# Patient Record
Sex: FEMALE | Race: WHITE | NOT HISPANIC OR LATINO | Employment: FULL TIME | URBAN - METROPOLITAN AREA
[De-identification: names, ages, dates, MRNs, and addresses within clinical notes are randomized per-mention and may not be internally consistent; named-entity substitution may affect disease eponyms.]

---

## 2017-01-05 ENCOUNTER — TELEPHONE (OUTPATIENT)
Dept: INTERNAL MEDICINE | Facility: CLINIC | Age: 47
End: 2017-01-05

## 2017-01-05 DIAGNOSIS — N30.00 ACUTE CYSTITIS WITHOUT HEMATURIA: Primary | ICD-10-CM

## 2017-01-05 PROBLEM — N39.0 URINARY TRACT INFECTION: Status: ACTIVE | Noted: 2017-01-05

## 2017-01-05 RX ORDER — FLUCONAZOLE 200 MG/1
TABLET ORAL
Qty: 2 TABLET | Refills: 0 | Status: SHIPPED | OUTPATIENT
Start: 2017-01-05 | End: 2017-03-01

## 2017-01-05 RX ORDER — LEVOFLOXACIN 750 MG/1
TABLET ORAL
Qty: 5 TABLET | Refills: 0 | Status: SHIPPED | OUTPATIENT
Start: 2017-01-05 | End: 2017-03-01

## 2017-01-05 NOTE — TELEPHONE ENCOUNTER
Patient needs to come in for a urinalysis and urine culture.  I sent in Levaquin 750 mg daily ×5 days.

## 2017-01-05 NOTE — TELEPHONE ENCOUNTER
Pt has had a UTI for about 1 month and has been getting treatment through her GYN. She has had 3 rounds of antibiotics and she still has symptoms. Should she make appt to see you. Should she do another culture before seeing you. She just had her last culture faxed to us which was about a week ago and it showed that she still has ecoli. Call her at [phone].

## 2017-01-13 LAB
APPEARANCE UR: CLEAR
BACTERIA UR CULT: NORMAL
BACTERIA UR CULT: NORMAL
BILIRUB UR QL STRIP: NORMAL
COLOR UR: YELLOW
GLUCOSE UR QL: NORMAL
HGB UR QL STRIP: NORMAL
KETONES UR QL STRIP: NORMAL
LEUKOCYTE ESTERASE UR QL STRIP: NORMAL
NITRITE UR QL STRIP: NORMAL
PH UR STRIP: 6 [PH] (ref 5–8)
PROT UR QL STRIP: NORMAL
SP GR UR: 1.01 (ref 1–1.03)
UROBILINOGEN UR STRIP-MCNC: NORMAL MG/DL

## 2017-02-15 ENCOUNTER — TELEPHONE (OUTPATIENT)
Dept: INTERNAL MEDICINE | Facility: CLINIC | Age: 47
End: 2017-02-15

## 2017-02-15 NOTE — TELEPHONE ENCOUNTER
Pt said that GYN did thyroid labs on her and her TSH was .04. They suggest that she decrease Levothyroxine to 25mg from 50mg. Last are faxing results to us. Can we send that into pharmacy. Call her at [phone].

## 2017-02-15 NOTE — TELEPHONE ENCOUNTER
I would not make any changes based on the lab from  OB/GYN.  I do not know which lab they use and when I checked her TSH back in November or December it was perfect.  Even if the TSH is a little low, it would not cause any problems until I reevaluate her.  I would stay on the same dose and keep previously scheduled follow-up appointment.

## 2017-03-01 ENCOUNTER — OFFICE VISIT (OUTPATIENT)
Dept: INTERNAL MEDICINE | Facility: CLINIC | Age: 47
End: 2017-03-01

## 2017-03-01 VITALS
HEIGHT: 64 IN | WEIGHT: 168 LBS | BODY MASS INDEX: 28.68 KG/M2 | DIASTOLIC BLOOD PRESSURE: 70 MMHG | HEART RATE: 82 BPM | OXYGEN SATURATION: 95 % | SYSTOLIC BLOOD PRESSURE: 122 MMHG

## 2017-03-01 DIAGNOSIS — Z23 NEED FOR TDAP VACCINATION: ICD-10-CM

## 2017-03-01 DIAGNOSIS — Z87.42 HISTORY OF MENORRHAGIA: ICD-10-CM

## 2017-03-01 DIAGNOSIS — Z92.29 HISTORY OF TETANUS, DIPHTHERIA, AND ACELLULAR PERTUSSIS BOOSTER VACCINATION (TDAP): ICD-10-CM

## 2017-03-01 DIAGNOSIS — H61.21 HEARING LOSS OF RIGHT EAR DUE TO CERUMEN IMPACTION: Primary | ICD-10-CM

## 2017-03-01 PROBLEM — Z87.440 HISTORY OF URINARY TRACT INFECTION: Status: ACTIVE | Noted: 2017-01-05

## 2017-03-01 PROBLEM — Z87.440 HISTORY OF URINARY TRACT INFECTION: Status: RESOLVED | Noted: 2017-01-05 | Resolved: 2017-03-01

## 2017-03-01 PROCEDURE — 99212 OFFICE O/P EST SF 10 MIN: CPT | Performed by: INTERNAL MEDICINE

## 2017-03-01 PROCEDURE — 69210 REMOVE IMPACTED EAR WAX UNI: CPT | Performed by: INTERNAL MEDICINE

## 2017-03-01 PROCEDURE — 90471 IMMUNIZATION ADMIN: CPT | Performed by: INTERNAL MEDICINE

## 2017-03-01 PROCEDURE — 90715 TDAP VACCINE 7 YRS/> IM: CPT | Performed by: INTERNAL MEDICINE

## 2017-03-01 RX ORDER — LEVOTHYROXINE SODIUM 0.05 MG/1
TABLET ORAL
Qty: 90 TABLET | Refills: 3
Start: 2017-03-01 | End: 2017-11-06 | Stop reason: SDUPTHER

## 2017-03-01 NOTE — PROGRESS NOTES
03/01/2017    Patient Information  Louann Roberts                                                                                          [address] [place] [address]      1970  [phone]      Chief Complaint:     Decreased hearing right ear.    History of Present Illness:    Patient with history of allergic rhinitis/environmental allergy, hyperlipidemia, hypothyroidism, chronic insomnia.  She presents today with complaints of decreased hearing in her right ear.  She recently went to the [facility] clinic and was diagnosed with possible flu as well as a left ear infection.  Her symptoms have improved but she is now complaining of decreased hearing in the right ear without fever, chills, or other systemic signs or symptoms.  Her past medical history extensively reviewed and updated where necessary.  This reveals she needs a TDaP vaccination.    Review of Systems   Constitution: Negative.   HENT: Positive for hearing loss.    Eyes: Negative.    Cardiovascular: Negative.    Respiratory: Negative.    Endocrine: Negative.    Hematologic/Lymphatic: Negative.    Skin: Negative.    Musculoskeletal: Negative.    Gastrointestinal: Negative.    Genitourinary: Negative.    Neurological: Negative.    Psychiatric/Behavioral: Negative.    Allergic/Immunologic: Negative.        Active Problems:    Patient Active Problem List   Diagnosis   • Allergic rhinitis   • Generalized anxiety disorder   • Hyperlipidemia   • Hypothyroidism   • Multiple environmental allergies   • Chronic insomnia   • Vitamin D deficiency   • Therapeutic drug monitoring   • Routine physical examination   • Generalized osteoarthritis of multiple sites   • History of menorrhagia   • Hearing loss of right ear due to cerumen impaction         Past Medical History   Diagnosis Date   • History of Candida vaginitis 11/03/2014 11/03/2014--patient called into the office with complaints of a yeast infection. No other symptoms of concern.  Diflucan 200 mg by mouth 1 now and repeat in one week.   • History of chest x-ray 12/9/2009 12/09/2009--chest x-ray no active disease.   • History of Dysplastic nevus of skin 3/24/2015     03/24/2014--5 mm punch biopsy performed for suspicious pigmented lesion mid, left back. Pathology returned compound melanocytic nevus with mild architectural disorder and mild cytologic atypia of melanocytes. (Dysplastic nevus, mild). Negative margins.   • History of migraine 3/18/2014     03/18/2014--patient reports that her migraine headaches have essentially resolved. She has not had one for several years.   • History of routine gynecologic exam yearly     Patient sees a gynecologist.   • History of Sprain of left knee 03/06/2014 03/18/2014--patient reports that her knee symptoms have totally resolved.  03/06/2014--patient presented with pain and swelling in her left knee that began after she was climbing over a gate and twisted her knee. Examination reveals some soft tissue swelling and perhaps a mild effusion particularly in the suprapatellar region. Her medial and lateral collateral ligaments seem to be somewhat lax. Th   • History of tetanus, diphtheria, and acellular pertussis booster vaccination (Tdap) 3/1/2017     03/01/2017--TDaP given.   • History of urinary tract infection 1/5/2017 01/05/2017--patient reports she has a recurrent or persistent urinary tract infection that has been treated by the gynecologist unsuccessfully.  We had them fax over her most recent urine culture which revealed Escherichia coli that was resistant to Bactrim but was sensitive to Macrobid and Cipro.  Patient needs to come in for urinalysis and urine culture and then I will start her empirically on Levaquin 750 mg daily ×5 days.         Past Surgical History   Procedure Laterality Date   • Breast augmentation  25 years     25 years of age--silicone   • Skin biopsy  03/24/2014 03/24/2014--5 mm punch biopsy performed for  suspicious pigmented lesion mid, left back. Pathology returned compound melanocytic nevus with mild architectural disorder and mild cytologic atypia of melanocytes. (Dysplastic nevus, mild). Negative margins.   • D&c hysteroscopy endometrial ablation N/A 12/12/2016 12/12/2016--Procedure: DILATATION AND CURETTAGE HYSTEROSCOPY NOVASURE ;  Surgeon: Imelda Brito MD;  Location: [facility];  Service:          No Known Allergies        Current Outpatient Prescriptions:   •  acetaminophen (TYLENOL) 500 MG tablet, Take 500 mg by mouth Every 6 (Six) Hours As Needed for mild pain (1-3)., Disp: , Rfl:   •  aspirin 81 MG tablet, Take 162 mg by mouth As Needed., Disp: , Rfl:   •  cetirizine (ZYRTEC ALLERGY) 10 MG tablet, Take 10 mg by mouth Daily., Disp: , Rfl:   •  levothyroxine (SYNTHROID) 50 MCG tablet, One by mouth daily as directed for thyroid., Disp: 90 tablet, Rfl: 3  •  Misc Natural Products (COSAMIN ASU ADVANCED FORMULA) capsule, 3 by mouth daily as directed, Disp: 90 capsule, Rfl: 0  •  Probiotic Product (PROBIOTIC DAILY PO), Take 1 tablet by mouth Daily., Disp: , Rfl:   •  zolpidem (AMBIEN) 10 MG tablet, Take 1 tablet by mouth at night as needed for sleep., Disp: 90 tablet, Rfl: 1      Family History   Problem Relation Age of Onset   • Hyperlipidemia Mother    • Hyperlipidemia Father    • Diabetes Paternal Uncle      Type 2   • Diabetes Paternal Grandmother      Type 2         Social History     Social History   • Marital status:      Spouse name: N/A   • Number of children: N/A   • Years of education: N/A     Occupational History   • [employer]      Social History Main Topics   • Smoking status: Former Smoker     Packs/day: 1.00     Years: 12.00     Types: Cigarettes, Electronic Cigarette     Quit date: 07/2013   • Smokeless tobacco: Never Used      Comment: used vapor prior to quitting    • Alcohol use Yes      Comment: Occasionally   • Drug use: No   • Sexual activity: Yes     Partners: Male     Other  "Topics Concern   • Not on file     Social History Narrative         Vitals:    03/01/17 1256   BP: 122/70   Pulse: 82   SpO2: 95%   Weight: 168 lb (76.2 kg)   Height: 64\" (162.6 cm)          Physical Exam:    General: Alert and oriented x 3.  No acute distress.  Normal affect.  HEENT: Pupils equal, round, reactive to light; extraocular movements intact; sclerae nonicteric; pharynx, ear canals and TMs normal after removal of cerumen from the right ear canal using irrigation and curettage.  Neck: Without JVD, thyromegaly, bruit, or adenopathy.  Lungs: Clear to auscultation in all fields.  Heart: Regular rate and rhythm without murmur, rub, gallop, or click.  Abdomen: Soft, nontender, without hepatosplenomegaly or hernia.  Bowel sounds normal.  : Deferred.  Rectal: Deferred.  Extremities: Without clubbing, cyanosis, edema, or pulse deficit.  Neurologic: Intact without focal deficit.  Normal station and gait observed during ingress and egress from the examination room.  Skin: Without significant lesion.  Musculoskeletal: Unremarkable.      Lab/other results:      Assessment/Plan:     Diagnosis Plan   1. Hearing loss of right ear due to cerumen impaction     2. Need for Tdap vaccination     3. History of tetanus, diphtheria, and acellular pertussis booster vaccination (Tdap)         Patient presents with complaints of hearing loss in the right ear and evaluation reveals this is due to impacted cerumen.  See procedure note.  Patient needs her TDaP vaccination.  She also has a history of menorrhagia and I reviewed fairly recent surgical procedure for this.      Procedures    Verbal informed consent given.  Risks, benefits, side effects discussed.  Cerumen was removed from the right ear canal using dilatation and curettage.  Patient tolerated procedure well without apparent complication.  Her ear canal and TMs little erythematous from the procedure but not significant.    "

## 2017-03-02 RX ORDER — ZOLPIDEM TARTRATE 10 MG/1
TABLET ORAL
Qty: 90 TABLET | Refills: 1 | OUTPATIENT
Start: 2017-03-02 | End: 2017-11-08 | Stop reason: SDUPTHER

## 2017-05-08 ENCOUNTER — APPOINTMENT (OUTPATIENT)
Dept: WOMENS IMAGING | Facility: HOSPITAL | Age: 47
End: 2017-05-08

## 2017-05-08 PROCEDURE — 77065 DX MAMMO INCL CAD UNI: CPT | Performed by: RADIOLOGY

## 2017-11-05 DIAGNOSIS — E03.9 HYPOTHYROIDISM: ICD-10-CM

## 2017-11-06 ENCOUNTER — OFFICE VISIT (OUTPATIENT)
Dept: INTERNAL MEDICINE | Facility: CLINIC | Age: 47
End: 2017-11-06

## 2017-11-06 VITALS
WEIGHT: 166.8 LBS | SYSTOLIC BLOOD PRESSURE: 120 MMHG | HEART RATE: 73 BPM | DIASTOLIC BLOOD PRESSURE: 78 MMHG | HEIGHT: 64 IN | BODY MASS INDEX: 28.48 KG/M2 | OXYGEN SATURATION: 99 %

## 2017-11-06 DIAGNOSIS — G93.32 CHRONIC FATIGUE DISORDER: ICD-10-CM

## 2017-11-06 DIAGNOSIS — Z00.00 ROUTINE PHYSICAL EXAMINATION: ICD-10-CM

## 2017-11-06 DIAGNOSIS — Z51.81 THERAPEUTIC DRUG MONITORING: ICD-10-CM

## 2017-11-06 DIAGNOSIS — R73.01 IMPAIRED FASTING GLUCOSE: ICD-10-CM

## 2017-11-06 DIAGNOSIS — E78.5 HYPERLIPIDEMIA, UNSPECIFIED HYPERLIPIDEMIA TYPE: Primary | Chronic | ICD-10-CM

## 2017-11-06 DIAGNOSIS — E03.9 HYPOTHYROIDISM, UNSPECIFIED TYPE: Chronic | ICD-10-CM

## 2017-11-06 DIAGNOSIS — E55.9 VITAMIN D DEFICIENCY: Chronic | ICD-10-CM

## 2017-11-06 PROBLEM — H61.21 HEARING LOSS OF RIGHT EAR DUE TO CERUMEN IMPACTION: Status: RESOLVED | Noted: 2017-03-01 | Resolved: 2017-11-06

## 2017-11-06 LAB
CRP SERPL-MCNC: 0.36 MG/DL (ref 0–0.5)
ERYTHROCYTE [DISTWIDTH] IN BLOOD BY AUTOMATED COUNT: 13.4 % (ref 11.7–13)
HBA1C MFR BLD: 5.2 % (ref 4.8–5.6)
HCT VFR BLD AUTO: 37.9 % (ref 35.6–45.5)
HGB BLD-MCNC: 12.5 G/DL (ref 11.9–15.5)
MCH RBC QN AUTO: 29.6 PG (ref 26.9–32)
MCHC RBC AUTO-ENTMCNC: 33 G/DL (ref 32.4–36.3)
MCV RBC AUTO: 89.6 FL (ref 80.5–98.2)
PLATELET # BLD AUTO: 263 10*3/MM3 (ref 140–500)
RBC # BLD AUTO: 4.23 10*6/MM3 (ref 3.9–5.2)
WBC # BLD AUTO: 7.53 10*3/MM3 (ref 4.5–10.7)

## 2017-11-06 PROCEDURE — 99214 OFFICE O/P EST MOD 30 MIN: CPT | Performed by: INTERNAL MEDICINE

## 2017-11-06 RX ORDER — LEVOTHYROXINE SODIUM 0.05 MG/1
TABLET ORAL
Qty: 90 TABLET | Refills: 3 | Status: SHIPPED | OUTPATIENT
Start: 2017-11-06 | End: 2018-10-16 | Stop reason: SDUPTHER

## 2017-11-06 NOTE — PROGRESS NOTES
11/06/2017    Patient Information  Louann Roberts                                                                                          [address] [address]      1970  [phone]      Chief Complaint:     Follow-up hyperlipidemia, hypothyroidism, vitamin D deficiency and environmental allergies.  Patient complaining of fatigue as described below.    History of Present Illness:    Patient with a history of medical problems as outlined in the chief complaint that have been fairly stable for about the past year.  She presents today for a routine follow-up with lab prior in order to monitor her chronic medical issues.  She has very mild hyperlipidemia which has not required medication as of yet.  She also has hypothyroidism which we will assess.    The history regarding chronic fatigue and hair loss:    11/06/2017--patient presents with a several month history of generalized fatigue.  She is also complaining of hair loss and was concerned for thyroid was not in therapeutic range.  Her thyroid functions are entirely normal today.  She does have a mildly elevated blood sugar of 105.  We will check hemoglobin A1c, CBC, and CRP today.    Review of Systems   Constitution: Positive for malaise/fatigue.   HENT: Negative.    Eyes: Negative.    Cardiovascular: Negative.    Respiratory: Negative.    Endocrine: Negative.    Hematologic/Lymphatic: Negative.    Skin: Negative.    Musculoskeletal: Negative.    Gastrointestinal: Negative.    Genitourinary: Negative.    Neurological: Negative.    Psychiatric/Behavioral: Negative.    Allergic/Immunologic: Negative.        Active Problems:    Patient Active Problem List   Diagnosis   • Allergic rhinitis   • Generalized anxiety disorder   • Hyperlipidemia   • Hypothyroidism   • Multiple environmental allergies   • Chronic insomnia   • Vitamin D deficiency   • Therapeutic drug monitoring   • Routine physical examination   • Generalized osteoarthritis  of multiple sites   • Impaired fasting glucose   • Chronic fatigue disorder         Past Medical History:   Diagnosis Date   • Allergic rhinitis 6/28/2016    Patient has never had allergy testing. Primarily has seasonal allergies.   • Chronic insomnia 6/28/2016   • Generalized anxiety disorder 6/28/2016   • Generalized osteoarthritis of multiple sites 11/10/2016    11/10/2016--patient reports she's having some problems with joint pain and stiffness particularly in the morning.  This particularly involves her hands and fingers and does seem to get better through the day.  She also has similar symptoms of her knees.  I have recommended Cosamin ASU.   • History of Candida vaginitis 11/03/2014 11/03/2014--patient called into the office with complaints of a yeast infection. No other symptoms of concern. Diflucan 200 mg by mouth 1 now and repeat in one week.   • History of chest x-ray 12/9/2009 12/09/2009--chest x-ray no active disease.   • History of Dysplastic nevus of skin 3/24/2015    03/24/2014--5 mm punch biopsy performed for suspicious pigmented lesion mid, left back. Pathology returned compound melanocytic nevus with mild architectural disorder and mild cytologic atypia of melanocytes. (Dysplastic nevus, mild). Negative margins.   • History of menorrhagia 12/12/2016 12/12/2016--Procedure: DILATATION AND CURETTAGE HYSTEROSCOPY JOSE MARTIN ;  Surgeon: Imelda Brito MD;  Location: [facility];  Service:    • History of migraine 3/18/2014    03/18/2014--patient reports that her migraine headaches have essentially resolved. She has not had one for several years.   • History of routine gynecologic exam yearly    Patient sees a gynecologist.   • History of Sprain of left knee 03/06/2014 03/18/2014--patient reports that her knee symptoms have totally resolved.  03/06/2014--patient presented with pain and swelling in her left knee that began after she was climbing over a gate and twisted her knee. Examination  reveals some soft tissue swelling and perhaps a mild effusion particularly in the suprapatellar region. Her medial and lateral collateral ligaments seem to be somewhat lax. Th   • History of tetanus, diphtheria, and acellular pertussis booster vaccination (Tdap) 3/1/2017    03/01/2017--TDaP given.   • History of urinary tract infection 1/5/2017 01/05/2017--patient reports she has a recurrent or persistent urinary tract infection that has been treated by the gynecologist unsuccessfully.  We had them fax over her most recent urine culture which revealed Escherichia coli that was resistant to Bactrim but was sensitive to Macrobid and Cipro.  Patient needs to come in for urinalysis and urine culture and then I will start her empirically on Levaquin 750 mg daily ×5 days.   • Hyperlipidemia 2/26/2014 02/26/2014--total cholesterol 206, triglycerides normal at 86, LDL cholesterol 135, HDL cholesterol 54. Recommend diet and exercise. Reevaluated in a few months with an NMR.   • Hypothyroidism 6/15/2015    09/13/2016--patient seen in follow-up and the results of the thyroid ultrasound discussed.  She has had an elevated TSH on several occasions over the past year or more.  She does have some symptoms consisting of fatigue, depression, weight gain, hair loss.  Levothyroxine 50 µg per day initiated.  Patient will follow-up in about 6 weeks with nonfasting lab work to reassess.  08/29/2016--normal thyroid ultrasound.  08/19/2016--routine annual exam.  TSH lightly elevated at 4.26.  Upper limit of normal at 4.20.  Ultrasound of thyroid ordered.  06/15/2015--TSH elevated at 4.51.  Thyroid antibodies were negative.  06/15/2015--patient seen for a routine physical examination and noted to have a mildly elevated TSH of 5.05. Repeat thyroid function tests ordered as well as thyroid antibodies. Patient will follow up on the phone.   • Multiple environmental allergies 6/28/2016    Patient has never had allergy testing. Primarily  has seasonal allergies.   • Vitamin D deficiency 8/16/2016         Past Surgical History:   Procedure Laterality Date   • BREAST AUGMENTATION  25 years    25 years of age--silicone   • D&C HYSTEROSCOPY ENDOMETRIAL ABLATION N/A 12/12/2016 12/12/2016--Procedure: DILATATION AND CURETTAGE HYSTEROSCOPY NOVASURE ;  Surgeon: Imelda Brito MD;  Location: [facility]  Service:    • SKIN BIOPSY  03/24/2014 03/24/2014--5 mm punch biopsy performed for suspicious pigmented lesion mid, left back. Pathology returned compound melanocytic nevus with mild architectural disorder and mild cytologic atypia of melanocytes. (Dysplastic nevus, mild). Negative margins.         No Known Allergies        Current Outpatient Prescriptions:   •  acetaminophen (TYLENOL) 500 MG tablet, Take 500 mg by mouth Every 6 (Six) Hours As Needed for mild pain (1-3)., Disp: , Rfl:   •  aspirin 81 MG tablet, Take 162 mg by mouth As Needed., Disp: , Rfl:   •  cetirizine (ZYRTEC ALLERGY) 10 MG tablet, Take 10 mg by mouth Daily., Disp: , Rfl:   •  levothyroxine (SYNTHROID, LEVOTHROID) 50 MCG tablet, ONE BY MOUTH DAILY AS DIRECTED FOR THYROID., Disp: 90 tablet, Rfl: 3  •  Misc Natural Products (COSAMIN ASU ADVANCED FORMULA) capsule, 3 by mouth daily as directed, Disp: 90 capsule, Rfl: 0  •  Probiotic Product (PROBIOTIC DAILY PO), Take 1 tablet by mouth Daily., Disp: , Rfl:   •  zolpidem (AMBIEN) 10 MG tablet, TAKE 1 TABLET BY MOUTH AT BEDTIME, Disp: 90 tablet, Rfl: 1      Family History   Problem Relation Age of Onset   • Hyperlipidemia Mother    • Hyperlipidemia Father    • Diabetes Paternal Uncle      Type 2   • Diabetes Paternal Grandmother      Type 2         Social History     Social History   • Marital status:      Spouse name: N/A   • Number of children: N/A   • Years of education: N/A     Occupational History   • [employer]      Social History Main Topics   • Smoking status: Former Smoker     Packs/day: 1.00     Years: 12.00     Types:  "Cigarettes, Electronic Cigarette     Quit date: 07/2013   • Smokeless tobacco: Never Used      Comment: used vapor prior to quitting    • Alcohol use Yes      Comment: Occasionally   • Drug use: No   • Sexual activity: Yes     Partners: Male     Other Topics Concern   • Not on file     Social History Narrative         Vitals:    11/06/17 0718   BP: 120/78   Pulse: 73   SpO2: 99%   Weight: 166 lb 12.8 oz (75.7 kg)   Height: 64\" (162.6 cm)          Physical Exam:    General: Alert and oriented x 3.  No acute distress.  Normal affect.  HEENT: Pupils equal, round, reactive to light; extraocular movements intact; sclerae nonicteric; pharynx, ear canals and TMs normal.  Neck: Without JVD, thyromegaly, bruit, or adenopathy.  Lungs: Clear to auscultation in all fields.  Heart: Regular rate and rhythm without murmur, rub, gallop, or click.  Abdomen: Soft, nontender, without hepatosplenomegaly or hernia.  Bowel sounds normal.  : Deferred.  Rectal: Deferred.  Extremities: Without clubbing, cyanosis, edema, or pulse deficit.  Neurologic: Intact without focal deficit.  Normal station and gait observed during ingress and egress from the examination room.  Skin: Without significant lesion.  Musculoskeletal: Unremarkable.      Lab/other results:    NMR reveals a total cholesterol 193.  Triglycerides normal at 126.  LDL particle number borderline elevated at 1426.  Small LDL particle number elevated at 755.  HDL particle number is normal at 31.8.  CMP normal except blood sugar elevated at 105.  Thyroid function tests are entirely normal.  Vitamin D normal.    Assessment/Plan:     Diagnosis Plan   1. Hyperlipidemia, unspecified hyperlipidemia type     2. Hypothyroidism, unspecified type     3. Vitamin D deficiency     4. Impaired fasting glucose     5. Chronic fatigue disorder       Patient has mild hyperlipidemia/dyslipidemia that is not bad enough at the present time to warrant medication.  Her thyroid is entirely therapeutic so " that is not the etiology of her fatigue.  The fatigue doesn't need further evaluation and so does apparent new diagnosis of impaired fasting glucose.    Plan is as follows: Check CBC, CRP, hemoglobin A1c.  Patient will follow-up on the phone for the results.  I have recommended exercise and weight loss.  I will have her follow-up in 6 months for annual.      Procedures

## 2017-11-08 RX ORDER — ZOLPIDEM TARTRATE 10 MG/1
TABLET ORAL
Qty: 90 TABLET | Refills: 1 | OUTPATIENT
Start: 2017-11-08 | End: 2017-11-28 | Stop reason: SDUPTHER

## 2017-11-28 RX ORDER — ZOLPIDEM TARTRATE 10 MG/1
10 TABLET ORAL NIGHTLY PRN
Qty: 30 TABLET | Refills: 2 | OUTPATIENT
Start: 2017-11-28 | End: 2018-07-03 | Stop reason: SDUPTHER

## 2017-11-28 RX ORDER — ZOLPIDEM TARTRATE 10 MG/1
10 TABLET ORAL NIGHTLY PRN
Qty: 30 TABLET | Refills: 5 | OUTPATIENT
Start: 2017-11-28 | End: 2017-11-28 | Stop reason: SDUPTHER

## 2017-12-08 ENCOUNTER — TELEPHONE (OUTPATIENT)
Dept: INTERNAL MEDICINE | Facility: CLINIC | Age: 47
End: 2017-12-08

## 2017-12-08 RX ORDER — FLUCONAZOLE 200 MG/1
200 TABLET ORAL DAILY
Qty: 2 TABLET | Refills: 0 | Status: SHIPPED | OUTPATIENT
Start: 2017-12-08 | End: 2018-07-20 | Stop reason: SDUPTHER

## 2017-12-08 NOTE — TELEPHONE ENCOUNTER
Pt called in and said that she went to [facility] with sinus infection and they put her on antibiotic. She said she always gets a UTI from them.  She ask if we could send in Difflucan to [facility] for her.

## 2017-12-08 NOTE — TELEPHONE ENCOUNTER
That would be a yeast infection not a UTI.  Diflucan 200 mg by mouth now ×1 and repeat in one week.

## 2018-02-16 ENCOUNTER — APPOINTMENT (OUTPATIENT)
Dept: WOMENS IMAGING | Facility: HOSPITAL | Age: 48
End: 2018-02-16

## 2018-02-16 PROCEDURE — 77067 SCR MAMMO BI INCL CAD: CPT | Performed by: RADIOLOGY

## 2018-02-16 PROCEDURE — 77063 BREAST TOMOSYNTHESIS BI: CPT | Performed by: RADIOLOGY

## 2018-07-03 RX ORDER — ZOLPIDEM TARTRATE 10 MG/1
10 TABLET ORAL
Qty: 30 TABLET | Refills: 0 | OUTPATIENT
Start: 2018-07-03 | End: 2018-08-01

## 2018-07-20 ENCOUNTER — TELEPHONE (OUTPATIENT)
Dept: INTERNAL MEDICINE | Facility: CLINIC | Age: 48
End: 2018-07-20

## 2018-07-20 RX ORDER — FLUCONAZOLE 200 MG/1
TABLET ORAL
Qty: 5 TABLET | Refills: 0 | Status: SHIPPED | OUTPATIENT
Start: 2018-07-20 | End: 2018-08-01

## 2018-07-24 ENCOUNTER — TELEPHONE (OUTPATIENT)
Dept: INTERNAL MEDICINE | Facility: CLINIC | Age: 48
End: 2018-07-24

## 2018-07-24 DIAGNOSIS — K64.9 ACUTE HEMORRHOID: Primary | ICD-10-CM

## 2018-08-01 ENCOUNTER — OFFICE VISIT (OUTPATIENT)
Dept: SURGERY | Facility: CLINIC | Age: 48
End: 2018-08-01

## 2018-08-01 VITALS
OXYGEN SATURATION: 98 % | DIASTOLIC BLOOD PRESSURE: 80 MMHG | BODY MASS INDEX: 25.27 KG/M2 | WEIGHT: 148 LBS | HEART RATE: 82 BPM | SYSTOLIC BLOOD PRESSURE: 110 MMHG | HEIGHT: 64 IN

## 2018-08-01 DIAGNOSIS — K64.2 GRADE III HEMORRHOIDS: Primary | ICD-10-CM

## 2018-08-01 PROCEDURE — 99203 OFFICE O/P NEW LOW 30 MIN: CPT | Performed by: SURGERY

## 2018-08-02 RX ORDER — ZOLPIDEM TARTRATE 10 MG/1
TABLET ORAL
Qty: 30 TABLET | Refills: 0 | OUTPATIENT
Start: 2018-08-02

## 2018-08-02 RX ORDER — ZOLPIDEM TARTRATE 10 MG/1
10 TABLET ORAL NIGHTLY PRN
Qty: 30 TABLET | Refills: 3 | OUTPATIENT
Start: 2018-08-02 | End: 2018-12-19 | Stop reason: SDUPTHER

## 2018-08-02 NOTE — PROGRESS NOTES
Chief Complaint   Patient presents with   • Hemorrhoids        Patient is a 48 y.o. female referred by Eulogio Steven MD for Evaluation of hemorrhoids.  Patient reports several months ago she had a bout of constipation with a hemorrhoid flaring up.  Patient is reporting it no longer is painful or bleeding.  However, patient is unable to keep it reduced and having difficulties with hygiene.  Patient reports she has never had this in the past.  Patient denies ever having a colonoscopy.  Patient has tried over-the-counter preparations which does help but does not make it go away.     Past Medical History:   Diagnosis Date   • Allergic rhinitis 6/28/2016    Patient has never had allergy testing. Primarily has seasonal allergies.   • Chronic insomnia 6/28/2016   • Generalized anxiety disorder 6/28/2016   • Generalized osteoarthritis of multiple sites 11/10/2016    11/10/2016--patient reports she's having some problems with joint pain and stiffness particularly in the morning.  This particularly involves her hands and fingers and does seem to get better through the day.  She also has similar symptoms of her knees.  I have recommended Cosamin ASU.   • History of Candida vaginitis 11/03/2014 11/03/2014--patient called into the office with complaints of a yeast infection. No other symptoms of concern. Diflucan 200 mg by mouth 1 now and repeat in one week.   • History of chest x-ray 12/9/2009 12/09/2009--chest x-ray no active disease.   • History of Dysplastic nevus of skin 3/24/2015    03/24/2014--5 mm punch biopsy performed for suspicious pigmented lesion mid, left back. Pathology returned compound melanocytic nevus with mild architectural disorder and mild cytologic atypia of melanocytes. (Dysplastic nevus, mild). Negative margins.   • History of menorrhagia 12/12/2016 12/12/2016--Procedure: DILATATION AND CURETTAGE HYSTEROSCOPY NOVASURE ;  Surgeon: Imelda Brito MD;  Location: [facility];  Service:    •  History of migraine 3/18/2014    03/18/2014--patient reports that her migraine headaches have essentially resolved. She has not had one for several years.   • History of routine gynecologic exam yearly    Patient sees a gynecologist.   • History of Sprain of left knee 03/06/2014 03/18/2014--patient reports that her knee symptoms have totally resolved.  03/06/2014--patient presented with pain and swelling in her left knee that began after she was climbing over a gate and twisted her knee. Examination reveals some soft tissue swelling and perhaps a mild effusion particularly in the suprapatellar region. Her medial and lateral collateral ligaments seem to be somewhat lax. Th   • History of tetanus, diphtheria, and acellular pertussis booster vaccination (Tdap) 3/1/2017    03/01/2017--TDaP given.   • History of urinary tract infection 1/5/2017 01/05/2017--patient reports she has a recurrent or persistent urinary tract infection that has been treated by the gynecologist unsuccessfully.  We had them fax over her most recent urine culture which revealed Escherichia coli that was resistant to Bactrim but was sensitive to Macrobid and Cipro.  Patient needs to come in for urinalysis and urine culture and then I will start her empirically on Levaquin 750 mg daily ×5 days.   • Hyperlipidemia 2/26/2014 02/26/2014--total cholesterol 206, triglycerides normal at 86, LDL cholesterol 135, HDL cholesterol 54. Recommend diet and exercise. Reevaluated in a few months with an NMR.   • Hypothyroidism 6/15/2015    09/13/2016--patient seen in follow-up and the results of the thyroid ultrasound discussed.  She has had an elevated TSH on several occasions over the past year or more.  She does have some symptoms consisting of fatigue, depression, weight gain, hair loss.  Levothyroxine 50 µg per day initiated.  Patient will follow-up in about 6 weeks with nonfasting lab work to reassess.  08/29/2016--normal thyroid ultrasound.   08/19/2016--routine annual exam.  TSH lightly elevated at 4.26.  Upper limit of normal at 4.20.  Ultrasound of thyroid ordered.  06/15/2015--TSH elevated at 4.51.  Thyroid antibodies were negative.  06/15/2015--patient seen for a routine physical examination and noted to have a mildly elevated TSH of 5.05. Repeat thyroid function tests ordered as well as thyroid antibodies. Patient will follow up on the phone.   • Multiple environmental allergies 6/28/2016    Patient has never had allergy testing. Primarily has seasonal allergies.   • Vitamin D deficiency 8/16/2016     Past Surgical History:   Procedure Laterality Date   • BREAST AUGMENTATION Bilateral 1992   • D&C HYSTEROSCOPY ENDOMETRIAL ABLATION N/A 12/12/2016 12/12/2016--Procedure: DILATATION AND CURETTAGE HYSTEROSCOPY NOVASURE ;  Surgeon: Imelda Brito MD;  Location: [facility];  Service:    • SKIN BIOPSY  03/24/2014 03/24/2014--5 mm punch biopsy performed for suspicious pigmented lesion mid, left back. Pathology returned compound melanocytic nevus with mild architectural disorder and mild cytologic atypia of melanocytes. (Dysplastic nevus, mild). Negative margins.     Family History   Problem Relation Age of Onset   • Hyperlipidemia Mother    • Hyperlipidemia Father    • Diabetes Paternal Uncle         Type 2   • Diabetes Paternal Grandmother         Type 2     Social History   Substance Use Topics   • Smoking status: Former Smoker     Packs/day: 1.00     Years: 12.00     Types: Cigarettes, Electronic Cigarette     Quit date: 07/2013   • Smokeless tobacco: Never Used      Comment: used vapor prior to quitting    • Alcohol use Yes      Comment: Occasionally         Current Outpatient Prescriptions:   •  acetaminophen (TYLENOL) 500 MG tablet, Take 500 mg by mouth Every 6 (Six) Hours As Needed for mild pain (1-3)., Disp: , Rfl:   •  cetirizine (ZYRTEC ALLERGY) 10 MG tablet, Take 10 mg by mouth As Needed., Disp: , Rfl:   •  levothyroxine (SYNTHROID,  "LEVOTHROID) 50 MCG tablet, ONE BY MOUTH DAILY AS DIRECTED FOR THYROID., Disp: 90 tablet, Rfl: 3  •  zolpidem (AMBIEN) 10 MG tablet, Take 1 tablet by mouth At Night As Needed for Sleep., Disp: 30 tablet, Rfl: 3    Review of Systems  All other systems have been reviewed and are negative.  Vitals:    08/01/18 1306   BP: 110/80   Pulse: 82   SpO2: 98%   Weight: 67.1 kg (148 lb)   Height: 162.6 cm (64\")       Physical Exam  General/physcological:   Alert and oriented x3 in no acute distress  HEENT: Normal cephalic, atraumatic, PERRLA, EOMI, sclera anicteric, moist mucous membranes, neck is supple, no JVD  CVA: RRR, normal S1-S2, no murmurs, no gallops or rubs  Rectal: External inspection of the anus reveals a residual skin tag anteriorly from previous hemorrhoid, no other prolapse is noted, digital examination reveals no rectal masses  Musculoskeletal: Full range of motion, no clubbing, no cyanosis or edema  Neurovascular: Grossly intact    Assessment:  Nonreducible skin tag from previous inflamed hemorrhoid  Plan:  I've advised patient for hygiene this could be removed however would be painful and she would need to take 2-3 weeks off from work.  Patient at this time does not desire any surgical intervention.    Joycelyn Potter MD  General, Minimally Invasive and Endoscopic Surgery  [facility]    [address], [address]  [place], [place], [address]  P: [phone]  F: [phone]    Cc:  Eulogio Steven MD                    "

## 2018-10-16 DIAGNOSIS — E03.9 HYPOTHYROIDISM: ICD-10-CM

## 2018-10-16 RX ORDER — LEVOTHYROXINE SODIUM 0.05 MG/1
50 TABLET ORAL DAILY
Qty: 30 TABLET | Refills: 0 | Status: SHIPPED | OUTPATIENT
Start: 2018-10-16 | End: 2018-11-26 | Stop reason: SDUPTHER

## 2018-10-29 ENCOUNTER — TELEPHONE (OUTPATIENT)
Dept: INTERNAL MEDICINE | Facility: CLINIC | Age: 48
End: 2018-10-29

## 2018-10-29 RX ORDER — CIPROFLOXACIN 500 MG/1
500 TABLET, FILM COATED ORAL EVERY 12 HOURS SCHEDULED
Qty: 10 TABLET | Refills: 0 | Status: SHIPPED | OUTPATIENT
Start: 2018-10-29 | End: 2019-01-24

## 2018-10-29 RX ORDER — FLUCONAZOLE 200 MG/1
TABLET ORAL
Qty: 2 TABLET | Refills: 0 | Status: SHIPPED | OUTPATIENT
Start: 2018-10-29 | End: 2019-01-24

## 2018-10-29 NOTE — TELEPHONE ENCOUNTER
Pt called and said she went to immediate care over weekend for UTI and they gave her macrobid.  She is allergic to that.  It gives her rash/hives    Can you send in Cipro 500mg and diflucan for her?        [phone]

## 2018-11-06 ENCOUNTER — RESULTS ENCOUNTER (OUTPATIENT)
Dept: INTERNAL MEDICINE | Facility: CLINIC | Age: 48
End: 2018-11-06

## 2018-11-06 DIAGNOSIS — E78.5 HYPERLIPIDEMIA, UNSPECIFIED HYPERLIPIDEMIA TYPE: Chronic | ICD-10-CM

## 2018-11-06 DIAGNOSIS — Z00.00 ROUTINE PHYSICAL EXAMINATION: ICD-10-CM

## 2018-11-06 DIAGNOSIS — E03.9 HYPOTHYROIDISM, UNSPECIFIED TYPE: Chronic | ICD-10-CM

## 2018-11-06 DIAGNOSIS — R73.01 IMPAIRED FASTING GLUCOSE: ICD-10-CM

## 2018-11-06 DIAGNOSIS — E55.9 VITAMIN D DEFICIENCY: Chronic | ICD-10-CM

## 2018-11-26 DIAGNOSIS — E03.9 HYPOTHYROIDISM: ICD-10-CM

## 2018-11-26 RX ORDER — LEVOTHYROXINE SODIUM 0.05 MG/1
50 TABLET ORAL DAILY
Qty: 30 TABLET | Refills: 0 | Status: SHIPPED | OUTPATIENT
Start: 2018-11-26 | End: 2018-12-20 | Stop reason: SDUPTHER

## 2018-12-19 RX ORDER — ZOLPIDEM TARTRATE 10 MG/1
10 TABLET ORAL
Qty: 30 TABLET | Refills: 0 | OUTPATIENT
Start: 2018-12-19 | End: 2019-01-24 | Stop reason: SDUPTHER

## 2018-12-20 DIAGNOSIS — E03.9 HYPOTHYROIDISM: ICD-10-CM

## 2018-12-20 RX ORDER — LEVOTHYROXINE SODIUM 0.05 MG/1
50 TABLET ORAL DAILY
Qty: 30 TABLET | Refills: 0 | Status: SHIPPED | OUTPATIENT
Start: 2018-12-20 | End: 2019-01-11 | Stop reason: SDUPTHER

## 2019-01-02 DIAGNOSIS — E03.9 HYPOTHYROIDISM: ICD-10-CM

## 2019-01-03 RX ORDER — LEVOTHYROXINE SODIUM 0.05 MG/1
50 TABLET ORAL DAILY
Qty: 30 TABLET | Refills: 0 | OUTPATIENT
Start: 2019-01-03

## 2019-01-11 DIAGNOSIS — E03.9 HYPOTHYROIDISM: ICD-10-CM

## 2019-01-11 RX ORDER — LEVOTHYROXINE SODIUM 0.05 MG/1
50 TABLET ORAL DAILY
Qty: 30 TABLET | Refills: 0 | Status: SHIPPED | OUTPATIENT
Start: 2019-01-11 | End: 2019-02-07 | Stop reason: SDUPTHER

## 2019-01-16 LAB
25(OH)D3+25(OH)D2 SERPL-MCNC: 33.4 NG/ML (ref 30–100)
ALBUMIN SERPL-MCNC: 4.2 G/DL (ref 3.5–5.2)
ALBUMIN/GLOB SERPL: 1.4 G/DL
ALP SERPL-CCNC: 53 U/L (ref 39–117)
ALT SERPL-CCNC: 13 U/L (ref 1–33)
AST SERPL-CCNC: 16 U/L (ref 1–32)
BILIRUB SERPL-MCNC: 0.6 MG/DL (ref 0.1–1.2)
BUN SERPL-MCNC: 16 MG/DL (ref 6–20)
BUN/CREAT SERPL: 22.9 (ref 7–25)
CALCIUM SERPL-MCNC: 9.3 MG/DL (ref 8.6–10.5)
CHLORIDE SERPL-SCNC: 99 MMOL/L (ref 98–107)
CHOLEST SERPL-MCNC: 220 MG/DL (ref 100–199)
CO2 SERPL-SCNC: 25.1 MMOL/L (ref 22–29)
CREAT SERPL-MCNC: 0.7 MG/DL (ref 0.57–1)
ERYTHROCYTE [DISTWIDTH] IN BLOOD BY AUTOMATED COUNT: 12.7 % (ref 11.7–13)
GLOBULIN SER CALC-MCNC: 2.9 GM/DL
GLUCOSE SERPL-MCNC: 90 MG/DL (ref 65–99)
HBA1C MFR BLD: 5.17 % (ref 4.8–5.6)
HCT VFR BLD AUTO: 39 % (ref 35.6–45.5)
HDL SERPL-SCNC: 37.7 UMOL/L
HDLC SERPL-MCNC: 63 MG/DL
HGB BLD-MCNC: 12.9 G/DL (ref 11.9–15.5)
LDL SERPL QN: 21.5 NM
LDL SERPL-SCNC: 1617 NMOL/L
LDL SMALL SERPL-SCNC: 475 NMOL/L
LDLC SERPL CALC-MCNC: 141 MG/DL (ref 0–99)
MCH RBC QN AUTO: 29.5 PG (ref 26.9–32)
MCHC RBC AUTO-ENTMCNC: 33.1 G/DL (ref 32.4–36.3)
MCV RBC AUTO: 89.2 FL (ref 80.5–98.2)
PLATELET # BLD AUTO: 287 10*3/MM3 (ref 140–500)
POTASSIUM SERPL-SCNC: 4.5 MMOL/L (ref 3.5–5.2)
PROT SERPL-MCNC: 7.1 G/DL (ref 6–8.5)
RBC # BLD AUTO: 4.37 10*6/MM3 (ref 3.9–5.2)
SODIUM SERPL-SCNC: 136 MMOL/L (ref 136–145)
T3FREE SERPL-MCNC: 3.5 PG/ML (ref 2–4.4)
T4 FREE SERPL-MCNC: 1.4 NG/DL (ref 0.93–1.7)
TRIGL SERPL-MCNC: 81 MG/DL (ref 0–149)
TSH SERPL DL<=0.005 MIU/L-ACNC: 2.13 MIU/ML (ref 0.27–4.2)
UNABLE TO VOID: NORMAL
WBC # BLD AUTO: 8.03 10*3/MM3 (ref 4.5–10.7)

## 2019-01-24 ENCOUNTER — OFFICE VISIT (OUTPATIENT)
Dept: INTERNAL MEDICINE | Facility: CLINIC | Age: 49
End: 2019-01-24

## 2019-01-24 ENCOUNTER — HOSPITAL ENCOUNTER (OUTPATIENT)
Dept: GENERAL RADIOLOGY | Facility: HOSPITAL | Age: 49
Discharge: HOME | End: 2019-01-24

## 2019-01-24 ENCOUNTER — HOSPITAL ENCOUNTER (OUTPATIENT)
Dept: GENERAL RADIOLOGY | Facility: HOSPITAL | Age: 49
Discharge: HOME | End: 2019-01-24
Admitting: INTERNAL MEDICINE

## 2019-01-24 VITALS
SYSTOLIC BLOOD PRESSURE: 124 MMHG | OXYGEN SATURATION: 99 % | DIASTOLIC BLOOD PRESSURE: 70 MMHG | WEIGHT: 151 LBS | HEART RATE: 83 BPM | HEIGHT: 65 IN | BODY MASS INDEX: 25.16 KG/M2

## 2019-01-24 DIAGNOSIS — R73.01 IMPAIRED FASTING GLUCOSE: Chronic | ICD-10-CM

## 2019-01-24 DIAGNOSIS — E03.9 HYPOTHYROIDISM, UNSPECIFIED TYPE: Chronic | ICD-10-CM

## 2019-01-24 DIAGNOSIS — F51.04 CHRONIC INSOMNIA: Chronic | ICD-10-CM

## 2019-01-24 DIAGNOSIS — F41.1 GENERALIZED ANXIETY DISORDER: Chronic | ICD-10-CM

## 2019-01-24 DIAGNOSIS — M79.642 BILATERAL HAND PAIN: ICD-10-CM

## 2019-01-24 DIAGNOSIS — M25.562 CHRONIC PAIN OF LEFT KNEE: ICD-10-CM

## 2019-01-24 DIAGNOSIS — G89.29 CHRONIC PAIN OF LEFT KNEE: ICD-10-CM

## 2019-01-24 DIAGNOSIS — M25.572 CHRONIC PAIN OF LEFT ANKLE: ICD-10-CM

## 2019-01-24 DIAGNOSIS — M79.641 BILATERAL HAND PAIN: ICD-10-CM

## 2019-01-24 DIAGNOSIS — G89.29 CHRONIC PAIN OF LEFT ANKLE: ICD-10-CM

## 2019-01-24 DIAGNOSIS — M15.9 GENERALIZED OSTEOARTHRITIS OF MULTIPLE SITES: Chronic | ICD-10-CM

## 2019-01-24 DIAGNOSIS — Z91.09 MULTIPLE ENVIRONMENTAL ALLERGIES: Chronic | ICD-10-CM

## 2019-01-24 DIAGNOSIS — E55.9 VITAMIN D DEFICIENCY: Chronic | ICD-10-CM

## 2019-01-24 DIAGNOSIS — Z00.00 ROUTINE PHYSICAL EXAMINATION: Primary | ICD-10-CM

## 2019-01-24 DIAGNOSIS — Z51.81 THERAPEUTIC DRUG MONITORING: Primary | ICD-10-CM

## 2019-01-24 DIAGNOSIS — M25.50 ARTHRALGIA OF MULTIPLE JOINTS: ICD-10-CM

## 2019-01-24 DIAGNOSIS — E78.5 HYPERLIPIDEMIA, UNSPECIFIED HYPERLIPIDEMIA TYPE: Chronic | ICD-10-CM

## 2019-01-24 DIAGNOSIS — Z51.81 THERAPEUTIC DRUG MONITORING: ICD-10-CM

## 2019-01-24 PROBLEM — K64.9 ACUTE HEMORRHOID: Status: RESOLVED | Noted: 2018-07-24 | Resolved: 2019-01-24

## 2019-01-24 PROBLEM — G93.32 CHRONIC FATIGUE DISORDER: Chronic | Status: RESOLVED | Noted: 2017-11-06 | Resolved: 2019-01-24

## 2019-01-24 LAB
APPEARANCE UR: CLEAR
BILIRUB UR QL STRIP: NEGATIVE
COLOR UR: YELLOW
GLUCOSE UR QL: NEGATIVE
HGB UR QL STRIP: NEGATIVE
KETONES UR QL STRIP: NEGATIVE
LEUKOCYTE ESTERASE UR QL STRIP: NEGATIVE
NITRITE UR QL STRIP: NEGATIVE
PH UR STRIP: 7.5 [PH] (ref 5–8)
PROT UR QL STRIP: NEGATIVE
SP GR UR: 1.01 (ref 1–1.03)
UROBILINOGEN UR STRIP-MCNC: NORMAL MG/DL

## 2019-01-24 PROCEDURE — 99396 PREV VISIT EST AGE 40-64: CPT | Performed by: INTERNAL MEDICINE

## 2019-01-24 PROCEDURE — 73610 X-RAY EXAM OF ANKLE: CPT

## 2019-01-24 PROCEDURE — 73560 X-RAY EXAM OF KNEE 1 OR 2: CPT

## 2019-01-24 PROCEDURE — 99213 OFFICE O/P EST LOW 20 MIN: CPT | Performed by: INTERNAL MEDICINE

## 2019-01-24 PROCEDURE — 73130 X-RAY EXAM OF HAND: CPT

## 2019-01-24 RX ORDER — ZOLPIDEM TARTRATE 10 MG/1
TABLET ORAL
Qty: 30 TABLET | Refills: 5 | OUTPATIENT
Start: 2019-01-24 | End: 2019-08-09 | Stop reason: SDUPTHER

## 2019-01-24 NOTE — PROGRESS NOTES
01/24/2019    Patient Information  Louann Roberts                                                                                          [address] [address]      1970  [unfilled]  There is no work phone number on file.    Chief Complaint:     Routine annual physical examination and follow-up lab work.  Complaining of diffuse joint aches and pains.    History of Present Illness:    Patient with a history of mild impaired fasting glucose, hyperlipidemia, hypothyroidism, environmental allergies/allergic rhinitis, chronic insomnia, vitamin D deficiency, generalized osteoarthritis, and history of generalized anxiety.  She presents today for routine annual physical exam and follow-up lab work.  She is having problems with joint pain as described below.  Past medical history reviewed and updated where necessary including health maintenance parameters.  This reveals she is up-to-date or else accounted for.    The history regarding arthralgia of multiple joints:    01/24/2019--patient presents with complaints of diffuse joint aches and pains.  Particularly involves her hands and fingers, knees, ankles.  No definite joint swelling.  She saw a dermatologist couple years ago who suggested that she might have a connective tissue disease.  On exam today I do not see any definite active synovitis although there are some changes probably consistent with degenerative arthritis.  I will order rheumatologic profile including sedimentation rate, CRP, JAYDEN, rheumatoid factor, uric acid level, CCP antibodies.    Review of Systems   Constitution: Negative.   HENT: Negative.    Eyes: Negative.    Cardiovascular: Negative.    Respiratory: Negative.    Endocrine: Negative.    Hematologic/Lymphatic: Negative.    Skin: Negative.    Musculoskeletal: Positive for arthritis and joint pain.   Gastrointestinal: Negative.    Genitourinary: Negative.    Neurological: Negative.    Psychiatric/Behavioral: Negative.     Allergic/Immunologic: Negative.        Active Problems:    Patient Active Problem List   Diagnosis   • Allergic rhinitis   • Generalized anxiety disorder   • Hyperlipidemia   • Hypothyroidism   • Multiple environmental allergies   • Chronic insomnia   • Vitamin D deficiency   • Therapeutic drug monitoring   • Routine physical examination   • Generalized osteoarthritis of multiple sites   • Arthralgia of multiple joints   • Bilateral hand pain   • Chronic pain of left ankle         Past Medical History:   Diagnosis Date   • Allergic rhinitis 6/28/2016    Patient has never had allergy testing. Primarily has seasonal allergies.   • Chronic insomnia 6/28/2016   • Generalized anxiety disorder 6/28/2016   • Generalized osteoarthritis of multiple sites 11/10/2016    11/10/2016--patient reports she's having some problems with joint pain and stiffness particularly in the morning.  This particularly involves her hands and fingers and does seem to get better through the day.  She also has similar symptoms of her knees.  I have recommended Cosamin ASU.   • History of Dysplastic nevus of skin 3/24/2015    03/24/2014--5 mm punch biopsy performed for suspicious pigmented lesion mid, left back. Pathology returned compound melanocytic nevus with mild architectural disorder and mild cytologic atypia of melanocytes. (Dysplastic nevus, mild). Negative margins.   • History of menorrhagia 12/12/2016 12/12/2016--Procedure: DILATATION AND CURETTAGE HYSTEROSCOPY NOVASURE ;  Surgeon: Imelda Brito MD;  Location: [facility];  Service:    • History of migraine 3/18/2014    03/18/2014--patient reports that her migraine headaches have essentially resolved. She has not had one for several years.   • History of routine gynecologic exam yearly    Patient sees a gynecologist.   • Hyperlipidemia 2/26/2014 02/26/2014--total cholesterol 206, triglycerides normal at 86, LDL cholesterol 135, HDL cholesterol 54. Recommend diet and exercise.  Reevaluated in a few months with an NMR.   • Hypothyroidism 6/15/2015    09/13/2016--patient seen in follow-up and the results of the thyroid ultrasound discussed.  She has had an elevated TSH on several occasions over the past year or more.  She does have some symptoms consisting of fatigue, depression, weight gain, hair loss.  Levothyroxine 50 µg per day initiated.  Patient will follow-up in about 6 weeks with nonfasting lab work to reassess.  08/29/2016--normal thyroid ultrasound.  08/19/2016--routine annual exam.  TSH lightly elevated at 4.26.  Upper limit of normal at 4.20.  Ultrasound of thyroid ordered.  06/15/2015--TSH elevated at 4.51.  Thyroid antibodies were negative.  06/15/2015--patient seen for a routine physical examination and noted to have a mildly elevated TSH of 5.05. Repeat thyroid function tests ordered as well as thyroid antibodies. Patient will follow up on the phone.   • Multiple environmental allergies 6/28/2016    Patient has never had allergy testing. Primarily has seasonal allergies.   • Vitamin D deficiency 8/16/2016         Past Surgical History:   Procedure Laterality Date   • BREAST AUGMENTATION Bilateral 1992   • D&C HYSTEROSCOPY ENDOMETRIAL ABLATION N/A 12/12/2016 12/12/2016--Procedure: DILATATION AND CURETTAGE HYSTEROSCOPY NOVASURE ;  Surgeon: Imelda Brito MD;  Location: [facility];  Service:    • SKIN BIOPSY  03/24/2014 03/24/2014--5 mm punch biopsy performed for suspicious pigmented lesion mid, left back. Pathology returned compound melanocytic nevus with mild architectural disorder and mild cytologic atypia of melanocytes. (Dysplastic nevus, mild). Negative margins.         Allergies   Allergen Reactions   • Sulfa Antibiotics Rash           Current Outpatient Medications:   •  FOLIC ACID PO, Take 800 mg by mouth Daily., Disp: , Rfl:   •  levothyroxine (SYNTHROID, LEVOTHROID) 50 MCG tablet, Take 1 tablet by mouth Daily. PT NEEDS LABS AND PHYSICAL. NO MORE RF WILL BE  GIVEN., Disp: 30 tablet, Rfl: 0  •  Multiple Vitamins-Minerals (MULTIVITAMIN ADULTS 50+ PO), Take 1 tablet by mouth Daily., Disp: , Rfl:   •  Omega-3 Fatty Acids (FISH OIL) 1200 MG capsule delayed-release, Take 1 capsule by mouth Daily., Disp: , Rfl:   •  Probiotic Product (PROBIOTIC PO), Take 1 tablet by mouth Daily., Disp: , Rfl:   •  psyllium (METAMUCIL) 0.52 g capsule, Take 0.52 g by mouth 2 (Two) Times a Day., Disp: , Rfl:   •  TURMERIC PO, Take 1,200 mg by mouth Daily., Disp: , Rfl:   •  zolpidem (AMBIEN) 10 MG tablet, Take 1 tablet by mouth every night at bedtime. PT NEEDS LABS AND PHYSICAL ASAP !, Disp: 30 tablet, Rfl: 0      Family History   Problem Relation Age of Onset   • Hyperlipidemia Mother    • Hyperlipidemia Father    • Diabetes Paternal Uncle         Type 2   • Diabetes Paternal Grandmother         Type 2         Social History     Socioeconomic History   • Marital status:      Spouse name: Not on file   • Number of children: 0   • Years of education: Not on file   • Highest education level: Some college, no degree   Social Needs   • Financial resource strain: Not very hard   • Food insecurity - worry: Never true   • Food insecurity - inability: Never true   • Transportation needs - medical: No   • Transportation needs - non-medical: No   Occupational History   • Occupation: [employer]   Tobacco Use   • Smoking status: Former Smoker     Packs/day: 1.00     Years: 12.00     Pack years: 12.00     Types: Cigarettes, Electronic Cigarette     Last attempt to quit: 2013     Years since quittin.5   • Smokeless tobacco: Never Used   • Tobacco comment: used vapor prior to quitting    Substance and Sexual Activity   • Alcohol use: Yes     Frequency: Monthly or less     Drinks per session: 1 or 2     Comment: Occasionally   • Drug use: No   • Sexual activity: Yes     Partners: Male   Other Topics Concern   • Not on file   Social History Narrative   • Not on file         Vitals:    19 1133   BP:  "124/70   Pulse: 83   SpO2: 99%   Weight: 68.5 kg (151 lb)   Height: 165.1 cm (65\")          Physical Exam:    General: Alert and oriented x 3.  No acute distress.  Normal affect.  HEENT: Pupils equal, round, reactive to light; extraocular movements intact; sclerae nonicteric; pharynx, ear canals and TMs normal.  Neck: Without JVD, thyromegaly, bruit, or adenopathy.  Lungs: Clear to auscultation in all fields.  Heart: Regular rate and rhythm without murmur, rub, gallop, or click.  Abdomen: Soft, nontender, without hepatosplenomegaly or hernia.  Bowel sounds normal.  : Deferred.  Rectal: Deferred.  Extremities: Without clubbing, cyanosis, edema, or pulse deficit.  Neurologic: Intact without focal deficit.  Normal station and gait observed during ingress and egress from the examination room.  Skin: Without significant lesion.  Musculoskeletal: I do not see any definite signs of synovitis.  However, there are some probable degenerative changes involving the knuckles and finger joints.  Her left ankle is somewhat enlarged compared to the right ankle laterally.      Lab/other results:    NMR reveals a total cholesterol 220.  LDL particle number elevated at 1617.  Triglycerides are normal at 81.  Small LDL particle number is excellent at 475.  HDL particle number is fairly good at 37.7.  CMP normal.  CBC normal.  Hemoglobin A1c normal at 5.17.  Thyroid function tests are normal.  Vitamin D normal.    Assessment/Plan:     Diagnosis Plan   1. Routine physical examination     2. Impaired fasting glucose     3. Hyperlipidemia, unspecified hyperlipidemia type     4. Hypothyroidism, unspecified type     5. Multiple environmental allergies     6. Chronic insomnia     7. Vitamin D deficiency     8. Generalized osteoarthritis of multiple sites     9. Generalized anxiety disorder     10. Arthralgia of multiple joints     11. Therapeutic drug monitoring     12. Bilateral hand pain     13. Chronic pain of left ankle       Patient " presents with essentially normal annual physical except for the following issues: She has a previous history of very mild impaired fasting glucose and it appears that this resolved with weight loss.  I we will resolve this issue.  She does have mild hyperlipidemia but her NMR profile was not bad enough to warrant medication.  Her thyroid is therapeutic.  Allergies are not much of an issue at this time.  Chronic insomnia is treated with Ambien.  Vitamin D is in the normal range.  Patient does have generalized osteoarthritis but she has worsening complaints of arthralgia of multiple joints and this needs to be further evaluated.    Several preventative health issues discussed including review of vaccinations and recommendations, including dietary issues, exercise and weight loss.  Safe sex practices discussed.  Patient advised to wear seatbelt whenever driving and avoid texting and driving.  Also advised to look both ways before crossing the street.  Colon cancer prevention discussed and is up-to-date with colonoscopy.  Advised to avoid tobacco products and minimize alcohol consumption.    Plan is as follows: X-ray of the hands and fingers.  X-ray of the left ankle.  Rheumatologic profile ordered.  Patient will follow-up on the phone for the results and possible further instructions.  I have recommended glucosamine on a regular basis.  I explained to patient that it can take several months before this can be helpful and sometimes is not helpful at all.  However, many patients find that it does provide relief but she just needs to be aware that she should not call it failure until after she's been on it for 6 months.        Procedures

## 2019-01-25 LAB
ANA SER QL: NEGATIVE
CCP IGA+IGG SERPL IA-ACNC: 8 UNITS (ref 0–19)
CRP SERPL-MCNC: 0.19 MG/DL (ref 0–0.5)
ERYTHROCYTE [SEDIMENTATION RATE] IN BLOOD BY WESTERGREN METHOD: 6 MM/HR (ref 0–20)
RHEUMATOID FACT SERPL-ACNC: <10 IU/ML (ref 0–13.9)
URATE SERPL-MCNC: 4.3 MG/DL (ref 2.4–5.7)

## 2019-02-07 DIAGNOSIS — E03.9 HYPOTHYROIDISM: ICD-10-CM

## 2019-02-07 RX ORDER — LEVOTHYROXINE SODIUM 0.05 MG/1
50 TABLET ORAL DAILY
Qty: 90 TABLET | Refills: 1 | Status: SHIPPED | OUTPATIENT
Start: 2019-02-07 | End: 2019-09-20 | Stop reason: SDUPTHER

## 2019-02-11 ENCOUNTER — OFFICE VISIT (OUTPATIENT)
Dept: INTERNAL MEDICINE | Facility: CLINIC | Age: 49
End: 2019-02-11

## 2019-02-11 VITALS
SYSTOLIC BLOOD PRESSURE: 140 MMHG | TEMPERATURE: 98.4 F | DIASTOLIC BLOOD PRESSURE: 80 MMHG | HEIGHT: 65 IN | OXYGEN SATURATION: 98 % | HEART RATE: 74 BPM | WEIGHT: 151 LBS | BODY MASS INDEX: 25.16 KG/M2

## 2019-02-11 DIAGNOSIS — Z91.09 MULTIPLE ENVIRONMENTAL ALLERGIES: Chronic | ICD-10-CM

## 2019-02-11 DIAGNOSIS — J30.1 CHRONIC SEASONAL ALLERGIC RHINITIS DUE TO POLLEN: Chronic | ICD-10-CM

## 2019-02-11 DIAGNOSIS — J20.9 ACUTE BRONCHITIS, UNSPECIFIED ORGANISM: Primary | ICD-10-CM

## 2019-02-11 PROBLEM — G89.29 CHRONIC PAIN OF LEFT KNEE: Status: RESOLVED | Noted: 2019-01-24 | Resolved: 2019-02-11

## 2019-02-11 PROBLEM — M25.572 CHRONIC PAIN OF LEFT ANKLE: Status: RESOLVED | Noted: 2019-01-24 | Resolved: 2019-02-11

## 2019-02-11 PROBLEM — M25.562 CHRONIC PAIN OF LEFT KNEE: Status: RESOLVED | Noted: 2019-01-24 | Resolved: 2019-02-11

## 2019-02-11 PROBLEM — G89.29 CHRONIC PAIN OF LEFT ANKLE: Status: RESOLVED | Noted: 2019-01-24 | Resolved: 2019-02-11

## 2019-02-11 PROBLEM — M79.642 BILATERAL HAND PAIN: Status: RESOLVED | Noted: 2019-01-24 | Resolved: 2019-02-11

## 2019-02-11 PROBLEM — M79.641 BILATERAL HAND PAIN: Status: RESOLVED | Noted: 2019-01-24 | Resolved: 2019-02-11

## 2019-02-11 PROCEDURE — 99214 OFFICE O/P EST MOD 30 MIN: CPT | Performed by: INTERNAL MEDICINE

## 2019-02-11 RX ORDER — CEFDINIR 300 MG/1
CAPSULE ORAL
Qty: 20 CAPSULE | Refills: 1 | Status: SHIPPED | OUTPATIENT
Start: 2019-02-11 | End: 2020-10-26

## 2019-02-11 NOTE — PROGRESS NOTES
02/11/2019    Patient Information  Louann Roberts                                                                                          [address] [facility] [address]      1970  [unfilled]  There is no work phone number on file.    Chief Complaint:     Complaining of head cold symptoms followed by chest congestion and cough.    History of Present Illness:    Patient with history of environmental allergies/allergic rhinitis, hyperlipidemia, hypothyroidism presents today with complaints of chest congestion and cough following cold-like symptoms.  This will be described in detail below.  Her past medical history reviewed and updated when necessary including health maintenance parameters.  This will be if she is up-to-date or else accounted for.    Review of Systems   Constitution: Negative. Negative for chills and fever.   HENT: Positive for congestion.    Eyes: Negative.    Cardiovascular: Negative.    Respiratory: Positive for cough and sputum production. Negative for shortness of breath.    Endocrine: Negative.    Hematologic/Lymphatic: Negative.    Skin: Negative.    Musculoskeletal: Negative.    Gastrointestinal: Negative.    Genitourinary: Negative.    Neurological: Negative.    Psychiatric/Behavioral: Negative.    Allergic/Immunologic: Negative.        Active Problems:    Patient Active Problem List   Diagnosis   • Allergic rhinitis   • Generalized anxiety disorder   • Hyperlipidemia   • Hypothyroidism   • Multiple environmental allergies   • Chronic insomnia   • Vitamin D deficiency   • Therapeutic drug monitoring   • Routine physical examination   • Generalized osteoarthritis of multiple sites   • Arthralgia of multiple joints   • Acute bronchitis         Past Medical History:   Diagnosis Date   • Allergic rhinitis 6/28/2016    Patient has never had allergy testing. Primarily has seasonal allergies.   • Chronic insomnia 6/28/2016   • Generalized anxiety disorder 6/28/2016   •  Generalized osteoarthritis of multiple sites 11/10/2016    11/10/2016--patient reports she's having some problems with joint pain and stiffness particularly in the morning.  This particularly involves her hands and fingers and does seem to get better through the day.  She also has similar symptoms of her knees.  I have recommended Cosamin ASU.   • History of Dysplastic nevus of skin 3/24/2015    03/24/2014--5 mm punch biopsy performed for suspicious pigmented lesion mid, left back. Pathology returned compound melanocytic nevus with mild architectural disorder and mild cytologic atypia of melanocytes. (Dysplastic nevus, mild). Negative margins.   • History of menorrhagia 12/12/2016 12/12/2016--Procedure: DILATATION AND CURETTAGE HYSTEROSCOPY NOVASURE ;  Surgeon: Imelda Brito MD;  Location: [facility];  Service:    • History of migraine 3/18/2014    03/18/2014--patient reports that her migraine headaches have essentially resolved. She has not had one for several years.   • History of routine gynecologic exam yearly    Patient sees a gynecologist.   • Hyperlipidemia 2/26/2014 02/26/2014--total cholesterol 206, triglycerides normal at 86, LDL cholesterol 135, HDL cholesterol 54. Recommend diet and exercise. Reevaluated in a few months with an NMR.   • Hypothyroidism 6/15/2015    09/13/2016--patient seen in follow-up and the results of the thyroid ultrasound discussed.  She has had an elevated TSH on several occasions over the past year or more.  She does have some symptoms consisting of fatigue, depression, weight gain, hair loss.  Levothyroxine 50 µg per day initiated.  Patient will follow-up in about 6 weeks with nonfasting lab work to reassess.  08/29/2016--normal thyroid ultrasound.  08/19/2016--routine annual exam.  TSH lightly elevated at 4.26.  Upper limit of normal at 4.20.  Ultrasound of thyroid ordered.  06/15/2015--TSH elevated at 4.51.  Thyroid antibodies were negative.  06/15/2015--patient seen  for a routine physical examination and noted to have a mildly elevated TSH of 5.05. Repeat thyroid function tests ordered as well as thyroid antibodies. Patient will follow up on the phone.   • Multiple environmental allergies 6/28/2016    Patient has never had allergy testing. Primarily has seasonal allergies.   • Vitamin D deficiency 8/16/2016         Past Surgical History:   Procedure Laterality Date   • BREAST AUGMENTATION Bilateral 1992   • D&C HYSTEROSCOPY ENDOMETRIAL ABLATION N/A 12/12/2016 12/12/2016--Procedure: DILATATION AND CURETTAGE HYSTEROSCOPY NOVASURE ;  Surgeon: Imelda Brito MD;  Location: [facility];  Service:    • SKIN BIOPSY  03/24/2014 03/24/2014--5 mm punch biopsy performed for suspicious pigmented lesion mid, left back. Pathology returned compound melanocytic nevus with mild architectural disorder and mild cytologic atypia of melanocytes. (Dysplastic nevus, mild). Negative margins.         Allergies   Allergen Reactions   • Sulfa Antibiotics Rash           Current Outpatient Medications:   •  FOLIC ACID PO, Take 800 mg by mouth Daily., Disp: , Rfl:   •  levothyroxine (SYNTHROID, LEVOTHROID) 50 MCG tablet, Take 1 tablet by mouth Daily., Disp: 90 tablet, Rfl: 1  •  Multiple Vitamins-Minerals (MULTIVITAMIN ADULTS 50+ PO), Take 1 tablet by mouth Daily., Disp: , Rfl:   •  Omega-3 Fatty Acids (FISH OIL) 1200 MG capsule delayed-release, Take 1 capsule by mouth Daily., Disp: , Rfl:   •  Probiotic Product (PROBIOTIC PO), Take 1 tablet by mouth Daily., Disp: , Rfl:   •  psyllium (METAMUCIL) 0.52 g capsule, Take 0.52 g by mouth 2 (Two) Times a Day., Disp: , Rfl:   •  TURMERIC PO, Take 1,200 mg by mouth Daily., Disp: , Rfl:   •  zolpidem (AMBIEN) 10 MG tablet, TAKE 1 TABLET BY MOUTH AT BEDTIME. *PATIENT NEEDS APPT*, Disp: 30 tablet, Rfl: 5  •  cefdinir (OMNICEF) 300 MG capsule, Take 1 p.o. twice daily until gone, Disp: 20 capsule, Rfl: 1  •  HYDROcod Polst-CPM Polst ER (TUSSIONEX PENNKINETIC  "ER) 10-8 MG/5ML ER suspension, Take 1 teaspoon every 12 hours as needed for cough., Disp: 115 mL, Rfl: 0      Family History   Problem Relation Age of Onset   • Hyperlipidemia Mother    • Hyperlipidemia Father    • Diabetes Paternal Uncle         Type 2   • Diabetes Paternal Grandmother         Type 2         Social History     Socioeconomic History   • Marital status:      Spouse name: Not on file   • Number of children: 0   • Years of education: Not on file   • Highest education level: Some college, no degree   Social Needs   • Financial resource strain: Not very hard   • Food insecurity - worry: Never true   • Food insecurity - inability: Never true   • Transportation needs - medical: No   • Transportation needs - non-medical: No   Occupational History   • Occupation: [employer]   Tobacco Use   • Smoking status: Former Smoker     Packs/day: 1.00     Years: 12.00     Pack years: 12.00     Types: Cigarettes, Electronic Cigarette     Last attempt to quit: 2013     Years since quittin.6   • Smokeless tobacco: Never Used   • Tobacco comment: used vapor prior to quitting    Substance and Sexual Activity   • Alcohol use: Yes     Frequency: Monthly or less     Drinks per session: 1 or 2     Comment: Occasionally   • Drug use: No   • Sexual activity: Yes     Partners: Male   Other Topics Concern   • Not on file   Social History Narrative   • Not on file         Vitals:    19 1358   BP: 140/80   Pulse: 74   Temp: 98.4 °F (36.9 °C)   SpO2: 98%   Weight: 68.5 kg (151 lb)   Height: 165.1 cm (65\")          Physical Exam:    General: Alert and oriented x 3.  No acute distress.  Normal affect.  HEENT: Pupils equal, round, reactive to light; extraocular movements intact; sclerae nonicteric; pharynx, ear canals and TMs normal.  Neck: Without JVD, thyromegaly, bruit, or adenopathy.  Lungs: Clear to auscultation in all fields.  Heart: Regular rate and rhythm without murmur, rub, gallop, or click.  Abdomen: Soft, " nontender, without hepatosplenomegaly or hernia.  Bowel sounds normal.  : Deferred.  Rectal: Deferred.  Extremities: Without clubbing, cyanosis, edema, or pulse deficit.  Neurologic: Intact without focal deficit.  Normal station and gait observed during ingress and egress from the examination room.  Skin: Without significant lesion.  Musculoskeletal: Unremarkable.    Lab/other results:      Assessment/Plan:     Diagnosis Plan   1. Acute bronchitis, unspecified organism  cefdinir (OMNICEF) 300 MG capsule    HYDROcod Polst-CPM Polst ER (TUSSIONEX PENNKINETIC ER) 10-8 MG/5ML ER suspension   2. Allergic rhinitis     3. Multiple environmental allergies       Plan: 4/11/2019--patient presents with approximately 5-day history of symptoms that started out as a head cold but now have moved into her chest.  She has complaints of chest congestion and cough productive of green phlegm without documented fever, chills, or other systemic signs or symptoms.  Her cough is much worse at night.  On exam there are scattered rhonchi but no wheezes and no signs of consolidation.  I think her illness started out initially as viral but I am concerned about a bacterial bronchitis and plan is to treat with cefdinir 300 mg p.o. twice daily times 10 days.  Tussionex cough syrup.            Procedures

## 2019-02-12 RX ORDER — FLUCONAZOLE 200 MG/1
200 TABLET ORAL DAILY
Qty: 2 TABLET | Refills: 0 | Status: SHIPPED | OUTPATIENT
Start: 2019-02-12 | End: 2020-09-28 | Stop reason: SDUPTHER

## 2019-03-01 ENCOUNTER — TELEPHONE (OUTPATIENT)
Dept: INTERNAL MEDICINE | Facility: CLINIC | Age: 49
End: 2019-03-01

## 2019-03-01 NOTE — TELEPHONE ENCOUNTER
Pt called wanting a steroid inhaler.  Still has cough from when she was here.  Still hard to breathe in, coughing.      [phone]

## 2019-03-01 NOTE — TELEPHONE ENCOUNTER
I sent an inhaler called Dulera to her pharmacy.  Because it is late on a Friday afternoon, if it is not covered by her insurance, then it will have to wait until the first part of next week.  I cannot get a preauthorization on the weekend.

## 2019-08-09 RX ORDER — ZOLPIDEM TARTRATE 10 MG/1
TABLET ORAL
Qty: 30 TABLET | Refills: 3 | OUTPATIENT
Start: 2019-08-09 | End: 2020-02-07

## 2019-09-20 DIAGNOSIS — E03.9 HYPOTHYROIDISM: ICD-10-CM

## 2019-09-20 RX ORDER — LEVOTHYROXINE SODIUM 0.05 MG/1
TABLET ORAL
Qty: 90 TABLET | Refills: 1 | Status: SHIPPED | OUTPATIENT
Start: 2019-09-20 | End: 2020-02-21 | Stop reason: SDUPTHER

## 2019-10-16 ENCOUNTER — APPOINTMENT (OUTPATIENT)
Dept: WOMENS IMAGING | Facility: HOSPITAL | Age: 49
End: 2019-10-16

## 2019-10-16 PROCEDURE — 77067 SCR MAMMO BI INCL CAD: CPT | Performed by: RADIOLOGY

## 2019-10-16 PROCEDURE — 77063 BREAST TOMOSYNTHESIS BI: CPT | Performed by: RADIOLOGY

## 2020-02-07 RX ORDER — ZOLPIDEM TARTRATE 10 MG/1
TABLET ORAL
Qty: 30 TABLET | Refills: 5 | Status: SHIPPED | OUTPATIENT
Start: 2020-02-07 | End: 2020-10-26 | Stop reason: SDUPTHER

## 2020-02-21 DIAGNOSIS — E03.9 HYPOTHYROIDISM: ICD-10-CM

## 2020-02-21 RX ORDER — LEVOTHYROXINE SODIUM 0.05 MG/1
50 TABLET ORAL DAILY
Qty: 90 TABLET | Refills: 1 | Status: SHIPPED | OUTPATIENT
Start: 2020-02-21 | End: 2020-08-24

## 2020-08-24 DIAGNOSIS — E03.9 HYPOTHYROIDISM: ICD-10-CM

## 2020-08-24 RX ORDER — LEVOTHYROXINE SODIUM 0.05 MG/1
TABLET ORAL
Qty: 30 TABLET | Refills: 0 | Status: SHIPPED | OUTPATIENT
Start: 2020-08-24 | End: 2020-10-26 | Stop reason: SDUPTHER

## 2020-09-03 DIAGNOSIS — E78.5 HYPERLIPIDEMIA, UNSPECIFIED HYPERLIPIDEMIA TYPE: Primary | Chronic | ICD-10-CM

## 2020-09-03 DIAGNOSIS — E55.9 VITAMIN D DEFICIENCY: Chronic | ICD-10-CM

## 2020-09-03 DIAGNOSIS — Z51.81 THERAPEUTIC DRUG MONITORING: ICD-10-CM

## 2020-09-03 DIAGNOSIS — F41.1 GENERALIZED ANXIETY DISORDER: Chronic | ICD-10-CM

## 2020-09-03 DIAGNOSIS — E03.9 HYPOTHYROIDISM, UNSPECIFIED TYPE: Chronic | ICD-10-CM

## 2020-09-03 DIAGNOSIS — Z00.00 ROUTINE PHYSICAL EXAMINATION: ICD-10-CM

## 2020-09-04 DIAGNOSIS — Z51.81 THERAPEUTIC DRUG MONITORING: ICD-10-CM

## 2020-09-04 DIAGNOSIS — E03.9 HYPOTHYROIDISM, UNSPECIFIED TYPE: Chronic | ICD-10-CM

## 2020-09-04 DIAGNOSIS — E55.9 VITAMIN D DEFICIENCY: Chronic | ICD-10-CM

## 2020-09-04 DIAGNOSIS — E78.5 HYPERLIPIDEMIA, UNSPECIFIED HYPERLIPIDEMIA TYPE: Chronic | ICD-10-CM

## 2020-09-04 DIAGNOSIS — F41.1 GENERALIZED ANXIETY DISORDER: Chronic | ICD-10-CM

## 2020-09-17 DIAGNOSIS — E03.9 HYPOTHYROIDISM: ICD-10-CM

## 2020-09-17 RX ORDER — LEVOTHYROXINE SODIUM 0.05 MG/1
TABLET ORAL
Qty: 30 TABLET | Refills: 0 | OUTPATIENT
Start: 2020-09-17

## 2020-09-23 ENCOUNTER — LAB (OUTPATIENT)
Dept: LAB | Facility: HOSPITAL | Age: 50
End: 2020-09-23

## 2020-09-23 LAB
25(OH)D3 SERPL-MCNC: 35.2 NG/ML (ref 30–100)
ALBUMIN SERPL-MCNC: 4.4 G/DL (ref 3.5–5.2)
ALBUMIN/GLOB SERPL: 1.6 G/DL
ALP SERPL-CCNC: 65 U/L (ref 39–117)
ALT SERPL W P-5'-P-CCNC: 14 U/L (ref 1–33)
ANION GAP SERPL CALCULATED.3IONS-SCNC: 8.2 MMOL/L (ref 5–15)
AST SERPL-CCNC: 14 U/L (ref 1–32)
BILIRUB SERPL-MCNC: 0.6 MG/DL (ref 0–1.2)
BILIRUB UR QL STRIP: NEGATIVE
BUN SERPL-MCNC: 15 MG/DL (ref 6–20)
BUN/CREAT SERPL: 20.3 (ref 7–25)
CALCIUM SPEC-SCNC: 9.4 MG/DL (ref 8.6–10.5)
CHLORIDE SERPL-SCNC: 99 MMOL/L (ref 98–107)
CLARITY UR: CLEAR
CO2 SERPL-SCNC: 28.8 MMOL/L (ref 22–29)
COLOR UR: YELLOW
CREAT SERPL-MCNC: 0.74 MG/DL (ref 0.57–1)
DEPRECATED RDW RBC AUTO: 39.5 FL (ref 37–54)
ERYTHROCYTE [DISTWIDTH] IN BLOOD BY AUTOMATED COUNT: 12.5 % (ref 12.3–15.4)
GFR SERPL CREATININE-BSD FRML MDRD: 83 ML/MIN/1.73
GLOBULIN UR ELPH-MCNC: 2.8 GM/DL
GLUCOSE SERPL-MCNC: 93 MG/DL (ref 65–99)
GLUCOSE UR STRIP-MCNC: NEGATIVE MG/DL
HBA1C MFR BLD: 5 % (ref 4.8–5.6)
HCT VFR BLD AUTO: 39.3 % (ref 34–46.6)
HGB BLD-MCNC: 13.1 G/DL (ref 12–15.9)
HGB UR QL STRIP.AUTO: NEGATIVE
KETONES UR QL STRIP: NEGATIVE
LEUKOCYTE ESTERASE UR QL STRIP.AUTO: NEGATIVE
MCH RBC QN AUTO: 29 PG (ref 26.6–33)
MCHC RBC AUTO-ENTMCNC: 33.3 G/DL (ref 31.5–35.7)
MCV RBC AUTO: 87.1 FL (ref 79–97)
NITRITE UR QL STRIP: NEGATIVE
PH UR STRIP.AUTO: 6 [PH] (ref 5–8)
PLATELET # BLD AUTO: 257 10*3/MM3 (ref 140–450)
PMV BLD AUTO: 11.2 FL (ref 6–12)
POTASSIUM SERPL-SCNC: 4.6 MMOL/L (ref 3.5–5.2)
PROT SERPL-MCNC: 7.2 G/DL (ref 6–8.5)
PROT UR QL STRIP: NEGATIVE
RBC # BLD AUTO: 4.51 10*6/MM3 (ref 3.77–5.28)
SODIUM SERPL-SCNC: 136 MMOL/L (ref 136–145)
SP GR UR STRIP: 1.01 (ref 1–1.03)
T3FREE SERPL-MCNC: 3.17 PG/ML (ref 2–4.4)
T4 FREE SERPL-MCNC: 1.39 NG/DL (ref 0.93–1.7)
TSH SERPL DL<=0.05 MIU/L-ACNC: 1.37 UIU/ML (ref 0.27–4.2)
UROBILINOGEN UR QL STRIP: NORMAL
WBC # BLD AUTO: 6.46 10*3/MM3 (ref 3.4–10.8)

## 2020-09-23 PROCEDURE — 83036 HEMOGLOBIN GLYCOSYLATED A1C: CPT | Performed by: INTERNAL MEDICINE

## 2020-09-23 PROCEDURE — 81003 URINALYSIS AUTO W/O SCOPE: CPT | Performed by: INTERNAL MEDICINE

## 2020-09-23 PROCEDURE — 85027 COMPLETE CBC AUTOMATED: CPT | Performed by: INTERNAL MEDICINE

## 2020-09-23 PROCEDURE — 82306 VITAMIN D 25 HYDROXY: CPT | Performed by: INTERNAL MEDICINE

## 2020-09-23 PROCEDURE — 84439 ASSAY OF FREE THYROXINE: CPT | Performed by: INTERNAL MEDICINE

## 2020-09-23 PROCEDURE — 84443 ASSAY THYROID STIM HORMONE: CPT | Performed by: INTERNAL MEDICINE

## 2020-09-23 PROCEDURE — 83704 LIPOPROTEIN BLD QUAN PART: CPT | Performed by: INTERNAL MEDICINE

## 2020-09-23 PROCEDURE — 84481 FREE ASSAY (FT-3): CPT | Performed by: INTERNAL MEDICINE

## 2020-09-23 PROCEDURE — 36415 COLL VENOUS BLD VENIPUNCTURE: CPT

## 2020-09-23 PROCEDURE — 80061 LIPID PANEL: CPT | Performed by: INTERNAL MEDICINE

## 2020-09-23 PROCEDURE — 80053 COMPREHEN METABOLIC PANEL: CPT | Performed by: INTERNAL MEDICINE

## 2020-09-24 LAB
CHOLEST SERPL-MCNC: 212 MG/DL (ref 100–199)
HDL SERPL-SCNC: 33.7 UMOL/L
HDLC SERPL-MCNC: 49 MG/DL
LDL SERPL QN: 20.4 NM
LDL SERPL-SCNC: 1625 NMOL/L
LDL SMALL SERPL-SCNC: 860 NMOL/L
LDLC SERPL CALC-MCNC: 135 MG/DL (ref 0–99)
TRIGL SERPL-MCNC: 157 MG/DL (ref 0–149)

## 2020-09-28 ENCOUNTER — TELEPHONE (OUTPATIENT)
Dept: INTERNAL MEDICINE | Facility: CLINIC | Age: 50
End: 2020-09-28

## 2020-09-28 DIAGNOSIS — B37.31 CANDIDA VAGINITIS: Primary | ICD-10-CM

## 2020-09-28 RX ORDER — FLUCONAZOLE 200 MG/1
200 TABLET ORAL DAILY
Qty: 2 TABLET | Refills: 0 | Status: SHIPPED | OUTPATIENT
Start: 2020-09-28 | End: 2020-10-26

## 2020-09-28 NOTE — TELEPHONE ENCOUNTER
Do you want to see her? Treat her or U/C  Patient calling and reports the following symptoms:  - vaginal itchiness  - vaginal discharge  Verified [facility] on [address]. Patient would like something called in to help.     Patient can be reached at [phone].

## 2020-09-28 NOTE — TELEPHONE ENCOUNTER
I informed pt md was sent and also told her I had done the pa for the praluent and am waiting to hear back

## 2020-09-28 NOTE — TELEPHONE ENCOUNTER
Patient calling and reports the following symptoms:  - vaginal itchiness  - vaginal discharge  Verified [facility] on [address]. Patient would like something called in to help.    Patient can be reached at [phone].

## 2020-10-26 ENCOUNTER — OFFICE VISIT (OUTPATIENT)
Dept: INTERNAL MEDICINE | Facility: CLINIC | Age: 50
End: 2020-10-26

## 2020-10-26 VITALS
OXYGEN SATURATION: 98 % | BODY MASS INDEX: 28.31 KG/M2 | WEIGHT: 165.8 LBS | SYSTOLIC BLOOD PRESSURE: 116 MMHG | HEIGHT: 64 IN | HEART RATE: 61 BPM | DIASTOLIC BLOOD PRESSURE: 64 MMHG

## 2020-10-26 DIAGNOSIS — E03.9 HYPOTHYROIDISM: ICD-10-CM

## 2020-10-26 DIAGNOSIS — F41.1 GENERALIZED ANXIETY DISORDER: Chronic | ICD-10-CM

## 2020-10-26 DIAGNOSIS — Z12.11 COLON CANCER SCREENING: ICD-10-CM

## 2020-10-26 DIAGNOSIS — Z91.09 MULTIPLE ENVIRONMENTAL ALLERGIES: Chronic | ICD-10-CM

## 2020-10-26 DIAGNOSIS — F51.04 CHRONIC INSOMNIA: Chronic | ICD-10-CM

## 2020-10-26 DIAGNOSIS — J30.1 CHRONIC SEASONAL ALLERGIC RHINITIS DUE TO POLLEN: Chronic | ICD-10-CM

## 2020-10-26 DIAGNOSIS — E55.9 VITAMIN D DEFICIENCY: Chronic | ICD-10-CM

## 2020-10-26 DIAGNOSIS — E78.2 MIXED HYPERLIPIDEMIA: Chronic | ICD-10-CM

## 2020-10-26 DIAGNOSIS — G93.32 CHRONIC FATIGUE DISORDER: ICD-10-CM

## 2020-10-26 DIAGNOSIS — Z00.00 ROUTINE PHYSICAL EXAMINATION: Primary | ICD-10-CM

## 2020-10-26 DIAGNOSIS — Z51.81 THERAPEUTIC DRUG MONITORING: ICD-10-CM

## 2020-10-26 DIAGNOSIS — Z23 NEED FOR INFLUENZA VACCINATION: ICD-10-CM

## 2020-10-26 DIAGNOSIS — E03.9 PRIMARY HYPOTHYROIDISM: Chronic | ICD-10-CM

## 2020-10-26 DIAGNOSIS — Z11.59 NEED FOR HEPATITIS C SCREENING TEST: ICD-10-CM

## 2020-10-26 DIAGNOSIS — M15.9 GENERALIZED OSTEOARTHRITIS OF MULTIPLE SITES: Chronic | ICD-10-CM

## 2020-10-26 PROBLEM — J20.9 ACUTE BRONCHITIS: Status: RESOLVED | Noted: 2019-02-11 | Resolved: 2020-10-26

## 2020-10-26 PROBLEM — M25.50 ARTHRALGIA OF MULTIPLE JOINTS: Status: RESOLVED | Noted: 2019-01-24 | Resolved: 2020-10-26

## 2020-10-26 PROCEDURE — 90686 IIV4 VACC NO PRSV 0.5 ML IM: CPT | Performed by: INTERNAL MEDICINE

## 2020-10-26 PROCEDURE — 99396 PREV VISIT EST AGE 40-64: CPT | Performed by: INTERNAL MEDICINE

## 2020-10-26 PROCEDURE — 90471 IMMUNIZATION ADMIN: CPT | Performed by: INTERNAL MEDICINE

## 2020-10-26 RX ORDER — LEVOTHYROXINE SODIUM 0.05 MG/1
TABLET ORAL
Qty: 90 TABLET | Refills: 3 | Status: SHIPPED | OUTPATIENT
Start: 2020-10-26 | End: 2021-09-13

## 2020-10-26 RX ORDER — ZOLPIDEM TARTRATE 10 MG/1
TABLET ORAL
Qty: 30 TABLET | Refills: 5 | Status: SHIPPED | OUTPATIENT
Start: 2020-10-26 | End: 2020-12-18 | Stop reason: SDUPTHER

## 2020-10-26 NOTE — PROGRESS NOTES
10/26/2020    Patient Information  Louann Roberts                                                                                          [address] [facility] [address]      1970  [unfilled]  There is no work phone number on file.    Chief Complaint:     Routine physical examination and follow-up lab work.  Complaining of generalized fatigue.    History of Present Illness:    Patient with a history of hyperlipidemia, hypothyroidism, environmental allergies/allergic rhinitis, generalized anxiety disorder, vitamin D deficiency, chronic insomnia, generalized osteoarthritis, previous complaints of chronic fatigue.  She presents today for routine annual physical examination and she is almost 1 year overdue for this.  She has complaints of generalized fatigue which she has had in the past.  See below for details.  Her past medical history reviewed and updated were necessary including health maintenance parameters.  This reveals she is behind on several health maintenance parameters.    Review of Systems   Constitution: Positive for malaise/fatigue.   HENT: Negative.    Eyes: Negative.    Cardiovascular: Negative.    Respiratory: Negative.    Endocrine: Negative.    Hematologic/Lymphatic: Negative.    Skin: Negative.    Musculoskeletal: Positive for arthritis.   Gastrointestinal: Negative.    Genitourinary: Negative.    Neurological: Negative.    Psychiatric/Behavioral: Negative.    Allergic/Immunologic: Negative.        Active Problems:    Patient Active Problem List   Diagnosis   • Allergic rhinitis   • Generalized anxiety disorder   • Hyperlipidemia   • Primary hypothyroidism   • Multiple environmental allergies   • Chronic insomnia   • Vitamin D deficiency   • Therapeutic drug monitoring   • Routine physical examination   • Generalized osteoarthritis of multiple sites   • Chronic fatigue disorder         Past Medical History:   Diagnosis Date   • Allergic rhinitis 6/28/2016    Patient has never  had allergy testing. Primarily has seasonal allergies.   • Chronic insomnia 6/28/2016   • Generalized anxiety disorder 6/28/2016   • Generalized osteoarthritis of multiple sites 11/10/2016    11/10/2016--patient reports she's having some problems with joint pain and stiffness particularly in the morning.  This particularly involves her hands and fingers and does seem to get better through the day.  She also has similar symptoms of her knees.  I have recommended Cosamin ASU.   • History of Dysplastic nevus of skin 3/24/2015    03/24/2014--5 mm punch biopsy performed for suspicious pigmented lesion mid, left back. Pathology returned compound melanocytic nevus with mild architectural disorder and mild cytologic atypia of melanocytes. (Dysplastic nevus, mild). Negative margins.   • History of menorrhagia 12/12/2016 12/12/2016--Procedure: DILATATION AND CURETTAGE HYSTEROSCOPY NOVASURE ;  Surgeon: Imelda Brito MD;  Location: [facility];  Service:    • History of migraine 3/18/2014    03/18/2014--patient reports that her migraine headaches have essentially resolved. She has not had one for several years.   • History of routine gynecologic exam yearly    Patient sees a gynecologist.   • Hyperlipidemia 2/26/2014 02/26/2014--total cholesterol 206, triglycerides normal at 86, LDL cholesterol 135, HDL cholesterol 54. Recommend diet and exercise. Reevaluated in a few months with an NMR.   • Hypothyroidism 6/15/2015    09/13/2016--patient seen in follow-up and the results of the thyroid ultrasound discussed.  She has had an elevated TSH on several occasions over the past year or more.  She does have some symptoms consisting of fatigue, depression, weight gain, hair loss.  Levothyroxine 50 µg per day initiated.  Patient will follow-up in about 6 weeks with nonfasting lab work to reassess.  08/29/2016--normal thyroid ultrasound.  08/19/2016--routine annual exam.  TSH lightly elevated at 4.26.  Upper limit of normal at  4.20.  Ultrasound of thyroid ordered.  06/15/2015--TSH elevated at 4.51.  Thyroid antibodies were negative.  06/15/2015--patient seen for a routine physical examination and noted to have a mildly elevated TSH of 5.05. Repeat thyroid function tests ordered as well as thyroid antibodies. Patient will follow up on the phone.   • Multiple environmental allergies 6/28/2016    Patient has never had allergy testing. Primarily has seasonal allergies.   • Vitamin D deficiency 8/16/2016         Past Surgical History:   Procedure Laterality Date   • BREAST AUGMENTATION Bilateral 1992   • D&C HYSTEROSCOPY ENDOMETRIAL ABLATION N/A 12/12/2016 12/12/2016--Procedure: DILATATION AND CURETTAGE HYSTEROSCOPY NOVASURE ;  Surgeon: Imelda Brito MD;  Location: [facility];  Service:    • SKIN BIOPSY  03/24/2014 03/24/2014--5 mm punch biopsy performed for suspicious pigmented lesion mid, left back. Pathology returned compound melanocytic nevus with mild architectural disorder and mild cytologic atypia of melanocytes. (Dysplastic nevus, mild). Negative margins.         Allergies   Allergen Reactions   • Sulfa Antibiotics Rash           Current Outpatient Medications:   •  levothyroxine (SYNTHROID, LEVOTHROID) 50 MCG tablet, Take 1 p.o. daily for low thyroid, Disp: 90 tablet, Rfl: 3  •  zolpidem (AMBIEN) 10 MG tablet, Take 1 p.o. nightly as needed insomnia, Disp: 30 tablet, Rfl: 5  •  FOLIC ACID PO, Take 800 mg by mouth Daily., Disp: , Rfl:       Family History   Problem Relation Age of Onset   • Hyperlipidemia Mother    • Hyperlipidemia Father    • Diabetes Paternal Uncle         Type 2   • Diabetes Paternal Grandmother         Type 2         Social History     Socioeconomic History   • Marital status:      Spouse name: Not on file   • Number of children: 0   • Years of education: Not on file   • Highest education level: Some college, no degree   Occupational History   • Occupation: [employer]   Social Needs   • Financial resource  "strain: Not very hard   • Food insecurity     Worry: Never true     Inability: Never true   • Transportation needs     Medical: No     Non-medical: No   Tobacco Use   • Smoking status: Former Smoker     Packs/day: 1.00     Years: 12.00     Pack years: 12.00     Types: Cigarettes, Electronic Cigarette     Quit date: 2013     Years since quittin.3   • Smokeless tobacco: Never Used   • Tobacco comment: used vapor prior to quitting    Substance and Sexual Activity   • Alcohol use: Yes     Frequency: Monthly or less     Drinks per session: 1 or 2     Comment: Occasionally   • Drug use: No   • Sexual activity: Yes     Partners: Male   Lifestyle   • Physical activity     Days per week: 7 days     Minutes per session: 60 min   • Stress: To some extent   Relationships   • Social connections     Talks on phone: Three times a week     Gets together: Once a week     Attends [employer] service: Never     Active member of club or organization: No     Attends meetings of clubs or organizations: Never     Relationship status:          Vitals:    10/26/20 0835   BP: 116/64   BP Location: Left arm   Pulse: 61   SpO2: 98%   Weight: 75.2 kg (165 lb 12.8 oz)   Height: 162.6 cm (64.02\")        Body mass index is 28.45 kg/m².      Physical Exam:    General: Alert and oriented x 3.  No acute distress.  Normal affect.  Overweight.  HEENT: Pupils equal, round, reactive to light; extraocular movements intact; sclerae nonicteric; pharynx, ear canals and TMs normal.  Neck: Without JVD, thyromegaly, bruit, or adenopathy.  Lungs: Clear to auscultation in all fields.  Heart: Regular rate and rhythm without murmur, rub, gallop, or click.  Abdomen: Soft, nontender, without hepatosplenomegaly or hernia.  Bowel sounds normal.  : Deferred.  Rectal: Deferred.  Extremities: Without clubbing, cyanosis, edema, or pulse deficit.  Neurologic: Intact without focal deficit.  Normal station and gait observed during ingress and egress from the " examination room.  Skin: Without significant lesion.  Musculoskeletal: Unremarkable.    Lab/other results:    NMR reveals a total cholesterol of 212.  Triglycerides elevated 157.  LDL particle number elevated at 1625.  Small LDL particle number elevated 860.  HDL particle number normal at 33.7.  CMP is normal.  CBC normal.  Hemoglobin A1c normal at 5.0.  Vitamin D normal at 35.2.  Thyroid function test normal.  Urinalysis normal.    Assessment/Plan:     Diagnosis Plan   1. Routine physical examination  Ambulatory Referral For Screening Colonoscopy    CBC (No Diff)    Comprehensive Metabolic Panel    NMR LipoProfile    Vitamin D 25 Hydroxy    TSH    T4, Free    T3, Free    Urinalysis With Microscopic If Indicated (No Culture) - Urine, Clean Catch   2. Hyperlipidemia  Comprehensive Metabolic Panel    NMR LipoProfile   3. Primary hypothyroidism  TSH    T4, Free    T3, Free    levothyroxine (SYNTHROID, LEVOTHROID) 50 MCG tablet   4. Multiple environmental allergies     5. Allergic rhinitis     6. Generalized anxiety disorder     7. Chronic insomnia  zolpidem (AMBIEN) 10 MG tablet   8. Vitamin D deficiency  Vitamin D 25 Hydroxy   9. Generalized osteoarthritis of multiple sites     10. Therapeutic drug monitoring  CBC (No Diff)    Urinalysis With Microscopic If Indicated (No Culture) - Urine, Clean Catch   11. Chronic fatigue disorder     12. Need for influenza vaccination  Fluarix Quad >6 Months ([id number])   13. Need for hepatitis C screening test  Hepatitis C Antibody   14. Colon cancer screening  Ambulatory Referral For Screening Colonoscopy   15. Hypothyroidism       Patient presents with essentially normal annual physical except for the following issues: She has hyperlipidemia and both I and the patient would prefer that she not have to take medication.  We will strongly recommend diet, exercise, and weight loss.  Her thyroid is therapeutic on the current dose of levothyroxine.  Her environmental allergies seem to  be reasonably controlled with Stahist although I question whether or not the allergies are contributing to her chronic fatigue.  She has generalized anxiety which has not required medication at the present time.  She also has chronic insomnia and takes Ambien for this which seems to be helpful.  Her vitamin D is in the normal range.  The etiology of her chronic fatigue is not clear.  I am not sure that further work-up would be fruitful.  I think that perhaps stress is playing a role.    Several preventative health issues discussed including review of vaccinations and recommendations, including dietary issues, exercise and weight loss.  Safe sex practices discussed.  Patient advised to wear seatbelt whenever driving and avoid texting and driving.  Also advised to look both ways before crossing the street.  Colon cancer prevention discussed and is due for a colonoscopy.  Advised to avoid tobacco products and minimize alcohol consumption.    Plan is as follows: Colonoscopy ordered.  Patient refuses influenza vaccine.  I have recommended that she check with her insurance regarding the Shingrix vaccine to see if it is covered.  Strongly recommend low carbohydrate diet, exercise, and weight loss.  I will go ahead and schedule her lab and follow-up for 1 year for her annual exam and I strongly encouraged her to keep this yearly exam.  Also encouraged her to follow-up with her gynecologist if she has not already done so.    Addendum: After discussion, patient has decided she will go ahead and have the influenza vaccine.    Procedures

## 2020-11-05 ENCOUNTER — APPOINTMENT (OUTPATIENT)
Dept: WOMENS IMAGING | Facility: HOSPITAL | Age: 50
End: 2020-11-05

## 2020-11-05 PROCEDURE — 77063 BREAST TOMOSYNTHESIS BI: CPT | Performed by: RADIOLOGY

## 2020-11-05 PROCEDURE — 77067 SCR MAMMO BI INCL CAD: CPT | Performed by: RADIOLOGY

## 2020-12-17 ENCOUNTER — TELEPHONE (OUTPATIENT)
Dept: INTERNAL MEDICINE | Facility: CLINIC | Age: 50
End: 2020-12-17

## 2020-12-17 NOTE — TELEPHONE ENCOUNTER
Patient called in requesting a PA for zolpidem (AMBIEN) 10 MG tablet    30 day supply    Best call back # [phone]

## 2020-12-18 DIAGNOSIS — F51.04 CHRONIC INSOMNIA: Chronic | ICD-10-CM

## 2020-12-18 RX ORDER — ZOLPIDEM TARTRATE 10 MG/1
TABLET ORAL
Qty: 30 TABLET | Refills: 5 | Status: SHIPPED | OUTPATIENT
Start: 2020-12-18 | End: 2021-01-14 | Stop reason: SDUPTHER

## 2021-01-14 ENCOUNTER — HOSPITAL ENCOUNTER (OUTPATIENT)
Dept: GENERAL RADIOLOGY | Facility: HOSPITAL | Age: 51
Discharge: HOME | End: 2021-01-14
Admitting: INTERNAL MEDICINE

## 2021-01-14 ENCOUNTER — OFFICE VISIT (OUTPATIENT)
Dept: INTERNAL MEDICINE | Facility: CLINIC | Age: 51
End: 2021-01-14

## 2021-01-14 ENCOUNTER — TELEPHONE (OUTPATIENT)
Dept: INTERNAL MEDICINE | Facility: CLINIC | Age: 51
End: 2021-01-14

## 2021-01-14 VITALS
HEART RATE: 64 BPM | WEIGHT: 166.4 LBS | OXYGEN SATURATION: 98 % | HEIGHT: 64 IN | DIASTOLIC BLOOD PRESSURE: 60 MMHG | SYSTOLIC BLOOD PRESSURE: 128 MMHG | BODY MASS INDEX: 28.41 KG/M2

## 2021-01-14 DIAGNOSIS — R06.2 NOCTURNAL COUGH WITH WHEEZE: ICD-10-CM

## 2021-01-14 DIAGNOSIS — F51.04 CHRONIC INSOMNIA: Chronic | ICD-10-CM

## 2021-01-14 DIAGNOSIS — J30.1 CHRONIC SEASONAL ALLERGIC RHINITIS DUE TO POLLEN: Chronic | ICD-10-CM

## 2021-01-14 DIAGNOSIS — R05.8 NOCTURNAL COUGH WITH WHEEZE: ICD-10-CM

## 2021-01-14 DIAGNOSIS — Z91.09 MULTIPLE ENVIRONMENTAL ALLERGIES: Chronic | ICD-10-CM

## 2021-01-14 DIAGNOSIS — R06.02 SHORTNESS OF BREATH: Primary | ICD-10-CM

## 2021-01-14 DIAGNOSIS — Z12.11 COLON CANCER SCREENING: ICD-10-CM

## 2021-01-14 PROBLEM — G93.32 CHRONIC FATIGUE DISORDER: Status: RESOLVED | Noted: 2017-11-06 | Resolved: 2021-01-14

## 2021-01-14 PROCEDURE — 99214 OFFICE O/P EST MOD 30 MIN: CPT | Performed by: INTERNAL MEDICINE

## 2021-01-14 PROCEDURE — 71046 X-RAY EXAM CHEST 2 VIEWS: CPT

## 2021-01-14 RX ORDER — ZOLPIDEM TARTRATE 10 MG/1
TABLET ORAL
Qty: 30 TABLET | Refills: 5 | Status: SHIPPED | OUTPATIENT
Start: 2021-01-14 | End: 2021-08-09

## 2021-01-14 NOTE — PROGRESS NOTES
01/14/2021    Patient Information  Louann Roberts                                                                                          [address] [facility] [address]      1970  [unfilled]  There is no work phone number on file.    Chief Complaint:     Complaining of shortness of breath and problems of allergies.    History of Present Illness:    Patient with a history of hyperlipidemia, hypothyroidism, environmental allergies/allergic rhinitis presents today with some concerns regarding her breathing as described below.  Her past medical history reviewed and updated were necessary including health maintenance parameters.  This reveals she needs colon cancer screening which she wants to defer due to personal reasons.    The history regarding shortness of breath and environmental allergies:    January 14, 2021--patient is an ex-smoker who smoked for approximately 30 years and stopped smoking about 5 years ago.  She presents with complaints of feeling short of breath either at rest or with exertion.  She feels she occasionally wheezes and also has a intermittent cough.  She is concerned that she has bronchitis or asthma.  She also has seasonal allergies.  She found an old inhaler consisting of Dulera that she started here recently and this may have helped.  She only occasionally notes wheezing.  On exam her lungs are totally clear although her breath sounds are a little diminished throughout.  Nothing definite or significant.  Heart exam is normal.  There is no lower extremity edema.  Plan is to obtain chest x-ray PA and lateral and pulmonary function test.  After reviewing those results then we may consider allergy referral although there is not a definite relationship between allergy symptoms and her symptoms of shortness of breath.  Asthma is a possibility however.    Review of Systems   Constitution: Negative.   HENT: Negative.    Eyes: Negative.    Cardiovascular: Negative.     Respiratory: Positive for cough, shortness of breath and wheezing. Negative for sputum production.    Endocrine: Negative.    Hematologic/Lymphatic: Negative.    Skin: Negative.    Musculoskeletal: Negative.    Gastrointestinal: Negative.    Genitourinary: Negative.    Neurological: Negative.    Psychiatric/Behavioral: Negative.    Allergic/Immunologic: Negative.        Active Problems:    Patient Active Problem List   Diagnosis   • Allergic rhinitis   • Generalized anxiety disorder   • Hyperlipidemia   • Primary hypothyroidism   • Multiple environmental allergies   • Chronic insomnia   • Vitamin D deficiency   • Therapeutic drug monitoring   • Routine physical examination   • Generalized osteoarthritis of multiple sites   • Shortness of breath   • Nocturnal cough with wheeze         Past Medical History:   Diagnosis Date   • Allergic rhinitis 6/28/2016    Patient has never had allergy testing. Primarily has seasonal allergies.   • Chronic insomnia 6/28/2016   • Generalized anxiety disorder 6/28/2016   • Generalized osteoarthritis of multiple sites 11/10/2016    11/10/2016--patient reports she's having some problems with joint pain and stiffness particularly in the morning.  This particularly involves her hands and fingers and does seem to get better through the day.  She also has similar symptoms of her knees.  I have recommended Cosamin ASU.   • History of Dysplastic nevus of skin 3/24/2015    03/24/2014--5 mm punch biopsy performed for suspicious pigmented lesion mid, left back. Pathology returned compound melanocytic nevus with mild architectural disorder and mild cytologic atypia of melanocytes. (Dysplastic nevus, mild). Negative margins.   • History of menorrhagia 12/12/2016 12/12/2016--Procedure: DILATATION AND CURETTAGE HYSTEROSCOPY NOVASURE ;  Surgeon: Imelda Brito MD;  Location: [facility];  Service:    • History of migraine 3/18/2014    03/18/2014--patient reports that her migraine headaches  have essentially resolved. She has not had one for several years.   • History of routine gynecologic exam yearly    Patient sees a gynecologist.   • Hyperlipidemia 2/26/2014 02/26/2014--total cholesterol 206, triglycerides normal at 86, LDL cholesterol 135, HDL cholesterol 54. Recommend diet and exercise. Reevaluated in a few months with an NMR.   • Hypothyroidism 6/15/2015    09/13/2016--patient seen in follow-up and the results of the thyroid ultrasound discussed.  She has had an elevated TSH on several occasions over the past year or more.  She does have some symptoms consisting of fatigue, depression, weight gain, hair loss.  Levothyroxine 50 µg per day initiated.  Patient will follow-up in about 6 weeks with nonfasting lab work to reassess.  08/29/2016--normal thyroid ultrasound.  08/19/2016--routine annual exam.  TSH lightly elevated at 4.26.  Upper limit of normal at 4.20.  Ultrasound of thyroid ordered.  06/15/2015--TSH elevated at 4.51.  Thyroid antibodies were negative.  06/15/2015--patient seen for a routine physical examination and noted to have a mildly elevated TSH of 5.05. Repeat thyroid function tests ordered as well as thyroid antibodies. Patient will follow up on the phone.   • Multiple environmental allergies 6/28/2016    Patient has never had allergy testing. Primarily has seasonal allergies.   • Vitamin D deficiency 8/16/2016         Past Surgical History:   Procedure Laterality Date   • BREAST AUGMENTATION Bilateral 1992   • D&C HYSTEROSCOPY ENDOMETRIAL ABLATION N/A 12/12/2016 12/12/2016--Procedure: DILATATION AND CURETTAGE HYSTEROSCOPY NOVASURE ;  Surgeon: Imelda Brito MD;  Location: [facility];  Service:    • SKIN BIOPSY  03/24/2014 03/24/2014--5 mm punch biopsy performed for suspicious pigmented lesion mid, left back. Pathology returned compound melanocytic nevus with mild architectural disorder and mild cytologic atypia of melanocytes. (Dysplastic nevus, mild). Negative  margins.         Allergies   Allergen Reactions   • Sulfa Antibiotics Rash           Current Outpatient Medications:   •  levothyroxine (SYNTHROID, LEVOTHROID) 50 MCG tablet, Take 1 p.o. daily for low thyroid, Disp: 90 tablet, Rfl: 3  •  zolpidem (AMBIEN) 10 MG tablet, Take 1 p.o. nightly as needed insomnia, Disp: 30 tablet, Rfl: 5      Family History   Problem Relation Age of Onset   • Hyperlipidemia Mother    • Hyperlipidemia Father    • Diabetes Paternal Uncle         Type 2   • Diabetes Paternal Grandmother         Type 2         Social History     Socioeconomic History   • Marital status:      Spouse name: Not on file   • Number of children: 0   • Years of education: Not on file   • Highest education level: Some college, no degree   Occupational History   • Occupation: [employer]   Social Needs   • Financial resource strain: Not very hard   • Food insecurity     Worry: Never true     Inability: Never true   • Transportation needs     Medical: No     Non-medical: No   Tobacco Use   • Smoking status: Former Smoker     Packs/day: 1.00     Years: 12.00     Pack years: 12.00     Types: Cigarettes, Electronic Cigarette     Quit date: 2013     Years since quittin.5   • Smokeless tobacco: Never Used   • Tobacco comment: used vapor prior to quitting    Substance and Sexual Activity   • Alcohol use: Yes     Frequency: Monthly or less     Drinks per session: 1 or 2     Comment: Occasionally   • Drug use: No   • Sexual activity: Yes     Partners: Male   Lifestyle   • Physical activity     Days per week: 7 days     Minutes per session: 60 min   • Stress: To some extent   Relationships   • Social connections     Talks on phone: Three times a week     Gets together: Once a week     Attends Anglican service: Never     Active member of club or organization: No     Attends meetings of clubs or organizations: Never     Relationship status:          Vitals:    21 1034   BP: 128/60   BP Location: Right arm  "  Pulse: 64   SpO2: 98%   Weight: 75.5 kg (166 lb 6.4 oz)   Height: 162.6 cm (64.02\")        Body mass index is 28.55 kg/m².      Physical Exam:    General: Alert and oriented x 3.  No acute distress.  Normal affect.  HEENT: Pupils equal, round, reactive to light; extraocular movements intact; sclerae nonicteric; pharynx, ear canals and TMs normal.  Neck: Without JVD, thyromegaly, bruit, or adenopathy.  Lungs: Clear to auscultation in all fields.  Heart: Regular rate and rhythm without murmur, rub, gallop, or click.  Abdomen: Soft, nontender, without hepatosplenomegaly or hernia.  Bowel sounds normal.  : Deferred.  Rectal: Deferred.  Extremities: Without clubbing, cyanosis, edema, or pulse deficit.  Neurologic: Intact without focal deficit.  Normal station and gait observed during ingress and egress from the examination room.  Skin: Without significant lesion.  Musculoskeletal: Unremarkable.    Lab/other results:       Assessment/Plan:     Diagnosis Plan   1. Shortness of breath  Full Pulmonary Function Test With Bronchodilator    XR Chest PA & Lateral   2. Nocturnal cough with wheeze     3. Multiple environmental allergies     4. Allergic rhinitis     5. Colon cancer screening  Cologuard - Stool, Per Rectum     January 14, 2021--patient is an ex-smoker who smoked for approximately 30 years and stopped smoking about 5 years ago.  She presents with complaints of feeling short of breath either at rest or with exertion.  She feels she occasionally wheezes and also has a intermittent cough.  She is concerned that she has bronchitis or asthma.  She also has seasonal allergies.  She found an old inhaler consisting of Dulera that she started here recently and this may have helped.  She only occasionally notes wheezing.  On exam her lungs are totally clear although her breath sounds are a little diminished throughout.  Nothing definite or significant.  Heart exam is normal.  There is no lower extremity edema.      Plan is " to obtain chest x-ray PA and lateral and pulmonary function test.  After reviewing those results then we may consider allergy referral although there is not a definite relationship between allergy symptoms and her symptoms of shortness of breath.  Asthma is a possibility however.  Patient can follow-up on the phone for the results of the chest x-ray which will be available by tomorrow.  I will have her schedule a personal follow-up appointment after the pulmonary function tests are scheduled 1 week later.  We can sit down and come up with a game plan at that time.    Addendum: Patient has agreed to do Cologuard test and I will place that order.  If it comes back negative then I will feel better about her delaying colonoscopy.    Procedures

## 2021-01-18 ENCOUNTER — TRANSCRIBE ORDERS (OUTPATIENT)
Dept: ADMINISTRATIVE | Facility: HOSPITAL | Age: 51
End: 2021-01-18

## 2021-01-18 DIAGNOSIS — Z01.818 OTHER SPECIFIED PRE-OPERATIVE EXAMINATION: Primary | ICD-10-CM

## 2021-01-20 ENCOUNTER — LAB (OUTPATIENT)
Dept: LAB | Facility: HOSPITAL | Age: 51
End: 2021-01-20

## 2021-01-20 DIAGNOSIS — Z01.818 OTHER SPECIFIED PRE-OPERATIVE EXAMINATION: ICD-10-CM

## 2021-01-20 PROCEDURE — U0004 COV-19 TEST NON-CDC HGH THRU: HCPCS

## 2021-01-20 PROCEDURE — C9803 HOPD COVID-19 SPEC COLLECT: HCPCS

## 2021-01-21 LAB — SARS-COV-2 RNA RESP QL NAA+PROBE: NOT DETECTED

## 2021-01-22 ENCOUNTER — HOSPITAL ENCOUNTER (OUTPATIENT)
Dept: RESPIRATORY THERAPY | Facility: HOSPITAL | Age: 51
Discharge: HOME | End: 2021-01-22
Admitting: INTERNAL MEDICINE

## 2021-01-22 DIAGNOSIS — R06.02 SHORTNESS OF BREATH: ICD-10-CM

## 2021-01-22 LAB
BDY SITE: NORMAL
HGB BLDA-MCNC: 13.3 G/DL (ref 12–18)

## 2021-01-22 PROCEDURE — 94729 DIFFUSING CAPACITY: CPT

## 2021-01-22 PROCEDURE — 94060 EVALUATION OF WHEEZING: CPT

## 2021-01-22 PROCEDURE — 94726 PLETHYSMOGRAPHY LUNG VOLUMES: CPT

## 2021-01-22 PROCEDURE — 82820 HEMOGLOBIN-OXYGEN AFFINITY: CPT | Performed by: INTERNAL MEDICINE

## 2021-01-22 RX ORDER — ALBUTEROL SULFATE 2.5 MG/3ML
2.5 SOLUTION RESPIRATORY (INHALATION) ONCE AS NEEDED
Status: COMPLETED | OUTPATIENT
Start: 2021-01-22 | End: 2021-01-22

## 2021-01-22 RX ADMIN — ALBUTEROL SULFATE 2.5 MG: 2.5 SOLUTION RESPIRATORY (INHALATION) at 09:53

## 2021-01-26 NOTE — PROGRESS NOTES
Pt ask for nebulizer and med. She said they gave her a treatment at [facility] after they did the PFT and it seemed to really help.

## 2021-03-26 ENCOUNTER — BULK ORDERING (OUTPATIENT)
Dept: CASE MANAGEMENT | Facility: OTHER | Age: 51
End: 2021-03-26

## 2021-03-26 DIAGNOSIS — Z23 IMMUNIZATION DUE: ICD-10-CM

## 2021-08-09 DIAGNOSIS — F51.04 CHRONIC INSOMNIA: Chronic | ICD-10-CM

## 2021-08-09 RX ORDER — ZOLPIDEM TARTRATE 10 MG/1
TABLET ORAL
Qty: 30 TABLET | Refills: 3 | Status: SHIPPED | OUTPATIENT
Start: 2021-08-09 | End: 2021-12-17

## 2021-09-11 DIAGNOSIS — E03.9 PRIMARY HYPOTHYROIDISM: Chronic | ICD-10-CM

## 2021-09-13 RX ORDER — LEVOTHYROXINE SODIUM 0.05 MG/1
TABLET ORAL
Qty: 90 TABLET | Refills: 3 | Status: SHIPPED | OUTPATIENT
Start: 2021-09-13 | End: 2022-08-08

## 2021-10-21 ENCOUNTER — LAB (OUTPATIENT)
Dept: LAB | Facility: HOSPITAL | Age: 51
End: 2021-10-21

## 2021-10-21 DIAGNOSIS — Z00.00 ROUTINE PHYSICAL EXAMINATION: ICD-10-CM

## 2021-10-21 DIAGNOSIS — E78.2 MIXED HYPERLIPIDEMIA: Chronic | ICD-10-CM

## 2021-10-21 DIAGNOSIS — Z11.59 NEED FOR HEPATITIS C SCREENING TEST: ICD-10-CM

## 2021-10-21 DIAGNOSIS — E03.9 PRIMARY HYPOTHYROIDISM: Chronic | ICD-10-CM

## 2021-10-21 DIAGNOSIS — Z51.81 THERAPEUTIC DRUG MONITORING: ICD-10-CM

## 2021-10-21 DIAGNOSIS — E55.9 VITAMIN D DEFICIENCY: Chronic | ICD-10-CM

## 2021-10-21 LAB
25(OH)D3 SERPL-MCNC: 35.8 NG/ML (ref 30–100)
ALBUMIN SERPL-MCNC: 4.7 G/DL (ref 3.5–5.2)
ALBUMIN/GLOB SERPL: 2 G/DL
ALP SERPL-CCNC: 73 U/L (ref 39–117)
ALT SERPL W P-5'-P-CCNC: 16 U/L (ref 1–33)
ANION GAP SERPL CALCULATED.3IONS-SCNC: 10.2 MMOL/L (ref 5–15)
AST SERPL-CCNC: 20 U/L (ref 1–32)
BACTERIA UR QL AUTO: NORMAL /HPF
BILIRUB SERPL-MCNC: 0.6 MG/DL (ref 0–1.2)
BILIRUB UR QL STRIP: NEGATIVE
BUN SERPL-MCNC: 13 MG/DL (ref 6–20)
BUN/CREAT SERPL: 17.8 (ref 7–25)
CALCIUM SPEC-SCNC: 9.1 MG/DL (ref 8.6–10.5)
CHLORIDE SERPL-SCNC: 100 MMOL/L (ref 98–107)
CLARITY UR: CLEAR
CO2 SERPL-SCNC: 26.8 MMOL/L (ref 22–29)
COLOR UR: YELLOW
CREAT SERPL-MCNC: 0.73 MG/DL (ref 0.57–1)
DEPRECATED RDW RBC AUTO: 42.4 FL (ref 37–54)
ERYTHROCYTE [DISTWIDTH] IN BLOOD BY AUTOMATED COUNT: 13 % (ref 12.3–15.4)
GFR SERPL CREATININE-BSD FRML MDRD: 84 ML/MIN/1.73
GLOBULIN UR ELPH-MCNC: 2.3 GM/DL
GLUCOSE SERPL-MCNC: 94 MG/DL (ref 65–99)
GLUCOSE UR STRIP-MCNC: NEGATIVE MG/DL
HCT VFR BLD AUTO: 40.3 % (ref 34–46.6)
HCV AB SER DONR QL: NORMAL
HGB BLD-MCNC: 12.8 G/DL (ref 12–15.9)
HGB UR QL STRIP.AUTO: NEGATIVE
HYALINE CASTS UR QL AUTO: NORMAL /LPF
KETONES UR QL STRIP: NEGATIVE
LEUKOCYTE ESTERASE UR QL STRIP.AUTO: ABNORMAL
MCH RBC QN AUTO: 28.6 PG (ref 26.6–33)
MCHC RBC AUTO-ENTMCNC: 31.8 G/DL (ref 31.5–35.7)
MCV RBC AUTO: 90.2 FL (ref 79–97)
NITRITE UR QL STRIP: NEGATIVE
PH UR STRIP.AUTO: 6 [PH] (ref 5–8)
PLATELET # BLD AUTO: 263 10*3/MM3 (ref 140–450)
PMV BLD AUTO: 11.2 FL (ref 6–12)
POTASSIUM SERPL-SCNC: 4.4 MMOL/L (ref 3.5–5.2)
PROT SERPL-MCNC: 7 G/DL (ref 6–8.5)
PROT UR QL STRIP: NEGATIVE
RBC # BLD AUTO: 4.47 10*6/MM3 (ref 3.77–5.28)
RBC # UR: NORMAL /HPF
REF LAB TEST METHOD: NORMAL
SODIUM SERPL-SCNC: 137 MMOL/L (ref 136–145)
SP GR UR STRIP: 1.01 (ref 1–1.03)
SQUAMOUS #/AREA URNS HPF: NORMAL /HPF
T3FREE SERPL-MCNC: 3.6 PG/ML (ref 2–4.4)
T4 FREE SERPL-MCNC: 1.5 NG/DL (ref 0.93–1.7)
TSH SERPL DL<=0.05 MIU/L-ACNC: 1.84 UIU/ML (ref 0.27–4.2)
UROBILINOGEN UR QL STRIP: ABNORMAL
WBC # BLD AUTO: 9.58 10*3/MM3 (ref 3.4–10.8)
WBC UR QL AUTO: NORMAL /HPF

## 2021-10-21 PROCEDURE — 84481 FREE ASSAY (FT-3): CPT

## 2021-10-21 PROCEDURE — 84439 ASSAY OF FREE THYROXINE: CPT

## 2021-10-21 PROCEDURE — 81001 URINALYSIS AUTO W/SCOPE: CPT

## 2021-10-21 PROCEDURE — 80061 LIPID PANEL: CPT

## 2021-10-21 PROCEDURE — 86803 HEPATITIS C AB TEST: CPT

## 2021-10-21 PROCEDURE — 82306 VITAMIN D 25 HYDROXY: CPT

## 2021-10-21 PROCEDURE — 85027 COMPLETE CBC AUTOMATED: CPT

## 2021-10-21 PROCEDURE — 84443 ASSAY THYROID STIM HORMONE: CPT

## 2021-10-21 PROCEDURE — 80053 COMPREHEN METABOLIC PANEL: CPT

## 2021-10-21 PROCEDURE — 36415 COLL VENOUS BLD VENIPUNCTURE: CPT

## 2021-10-21 PROCEDURE — 83704 LIPOPROTEIN BLD QUAN PART: CPT

## 2021-10-23 LAB
CHOLEST SERPL-MCNC: 240 MG/DL (ref 100–199)
HDL SERPL-SCNC: 32.4 UMOL/L
HDLC SERPL-MCNC: 56 MG/DL
LDL SERPL QN: 21.2 NM
LDL SERPL-SCNC: 1949 NMOL/L
LDL SMALL SERPL-SCNC: 645 NMOL/L
LDLC SERPL CALC-MCNC: 161 MG/DL (ref 0–99)
TRIGL SERPL-MCNC: 129 MG/DL (ref 0–149)

## 2021-10-28 ENCOUNTER — OFFICE VISIT (OUTPATIENT)
Dept: INTERNAL MEDICINE | Facility: CLINIC | Age: 51
End: 2021-10-28

## 2021-10-28 VITALS
BODY MASS INDEX: 28.13 KG/M2 | OXYGEN SATURATION: 98 % | HEIGHT: 64 IN | WEIGHT: 164.8 LBS | HEART RATE: 82 BPM | SYSTOLIC BLOOD PRESSURE: 124 MMHG | DIASTOLIC BLOOD PRESSURE: 70 MMHG | RESPIRATION RATE: 18 BRPM

## 2021-10-28 DIAGNOSIS — E55.9 VITAMIN D DEFICIENCY: Chronic | ICD-10-CM

## 2021-10-28 DIAGNOSIS — F51.04 CHRONIC INSOMNIA: Chronic | ICD-10-CM

## 2021-10-28 DIAGNOSIS — M15.9 GENERALIZED OSTEOARTHRITIS OF MULTIPLE SITES: Chronic | ICD-10-CM

## 2021-10-28 DIAGNOSIS — J30.1 CHRONIC SEASONAL ALLERGIC RHINITIS DUE TO POLLEN: Chronic | ICD-10-CM

## 2021-10-28 DIAGNOSIS — Z91.09 MULTIPLE ENVIRONMENTAL ALLERGIES: Chronic | ICD-10-CM

## 2021-10-28 DIAGNOSIS — Z00.00 ROUTINE PHYSICAL EXAMINATION: Primary | ICD-10-CM

## 2021-10-28 DIAGNOSIS — Z23 NEED FOR INFLUENZA VACCINATION: ICD-10-CM

## 2021-10-28 DIAGNOSIS — E03.9 PRIMARY HYPOTHYROIDISM: Chronic | ICD-10-CM

## 2021-10-28 DIAGNOSIS — E78.2 MIXED HYPERLIPIDEMIA: Chronic | ICD-10-CM

## 2021-10-28 DIAGNOSIS — Z51.81 THERAPEUTIC DRUG MONITORING: ICD-10-CM

## 2021-10-28 PROBLEM — R06.2 NOCTURNAL COUGH WITH WHEEZE: Status: RESOLVED | Noted: 2021-01-14 | Resolved: 2021-10-28

## 2021-10-28 PROBLEM — R06.02 SHORTNESS OF BREATH: Status: RESOLVED | Noted: 2021-01-14 | Resolved: 2021-10-28

## 2021-10-28 PROBLEM — R05.8 NOCTURNAL COUGH WITH WHEEZE: Status: RESOLVED | Noted: 2021-01-14 | Resolved: 2021-10-28

## 2021-10-28 PROCEDURE — 99396 PREV VISIT EST AGE 40-64: CPT | Performed by: INTERNAL MEDICINE

## 2021-10-28 PROCEDURE — 99214 OFFICE O/P EST MOD 30 MIN: CPT | Performed by: INTERNAL MEDICINE

## 2021-10-28 RX ORDER — PROGESTERONE 100 MG/1
CAPSULE ORAL
COMMUNITY
Start: 2021-09-27 | End: 2022-02-10

## 2021-10-28 RX ORDER — EZETIMIBE AND SIMVASTATIN 10; 20 MG/1; MG/1
TABLET ORAL
Qty: 90 TABLET | Refills: 3 | Status: SHIPPED | OUTPATIENT
Start: 2021-10-28 | End: 2022-09-07

## 2021-10-28 RX ORDER — ESTRADIOL 0.04 MG/D
1 FILM, EXTENDED RELEASE TRANSDERMAL 2 TIMES WEEKLY
COMMUNITY
Start: 2021-08-23 | End: 2022-02-10

## 2021-10-28 NOTE — PROGRESS NOTES
10/28/2021    Patient Information  Louann Roberts                                                                                          [address] [facility] [address]      1970  [unfilled]  There is no work phone number on file.    Chief Complaint:     Routine annual physical examination and follow-up blood work.  No new acute complaints.    History of Present Illness:    Patient with history of hyperlipidemia, hypothyroidism, environmental allergies/allergic rhinitis, chronic insomnia, vitamin D deficiency, generalized osteoarthritis.  She presents today for routine annual physical exam and follow-up blood work in order to monitor chronic medical issues.  Her past medical history reviewed and updated were necessary including health maintenance parameters.  This reveals she needs her Covid vaccine booster which I encouraged her to get as soon as it is available for her.  She also needs influenza vaccine which I encouraged her to get, perhaps today.  She also needs to discuss with her insurance company regarding coverage of Shingrix vaccine and I have recommended that she get this but I want her to wait until next year after she has had all of her Covid vaccinations.    Review of Systems   Constitutional: Negative.   HENT: Negative.    Eyes: Negative.    Cardiovascular: Negative.    Respiratory: Negative.    Endocrine: Negative.    Hematologic/Lymphatic: Negative.    Skin: Negative.    Musculoskeletal: Positive for arthritis and joint pain.   Gastrointestinal: Negative.    Genitourinary: Negative.    Neurological: Negative.    Psychiatric/Behavioral: Negative.    Allergic/Immunologic: Negative.        Active Problems:    Patient Active Problem List   Diagnosis   • Allergic rhinitis   • Generalized anxiety disorder   • Hyperlipidemia   • Primary hypothyroidism   • Multiple environmental allergies   • Chronic insomnia   • Vitamin D deficiency   • Therapeutic drug monitoring   • Routine  physical examination   • Generalized osteoarthritis of multiple sites         Past Medical History:   Diagnosis Date   • Allergic rhinitis 6/28/2016    Patient has never had allergy testing. Primarily has seasonal allergies.   • Chronic insomnia 6/28/2016   • Generalized anxiety disorder 6/28/2016   • Generalized osteoarthritis of multiple sites 11/10/2016    11/10/2016--patient reports she's having some problems with joint pain and stiffness particularly in the morning.  This particularly involves her hands and fingers and does seem to get better through the day.  She also has similar symptoms of her knees.  I have recommended Cosamin ASU.   • History of Dysplastic nevus of skin 3/24/2015    03/24/2014--5 mm punch biopsy performed for suspicious pigmented lesion mid, left back. Pathology returned compound melanocytic nevus with mild architectural disorder and mild cytologic atypia of melanocytes. (Dysplastic nevus, mild). Negative margins.   • History of menorrhagia 12/12/2016 12/12/2016--Procedure: DILATATION AND CURETTAGE HYSTEROSCOPY NOVASURE ;  Surgeon: Imelda Brito MD;  Location: [facility];  Service:    • History of migraine 3/18/2014    03/18/2014--patient reports that her migraine headaches have essentially resolved. She has not had one for several years.   • History of routine gynecologic exam yearly    Patient sees a gynecologist.   • Hyperlipidemia 2/26/2014 02/26/2014--total cholesterol 206, triglycerides normal at 86, LDL cholesterol 135, HDL cholesterol 54. Recommend diet and exercise. Reevaluated in a few months with an NMR.   • Hypothyroidism 6/15/2015    09/13/2016--patient seen in follow-up and the results of the thyroid ultrasound discussed.  She has had an elevated TSH on several occasions over the past year or more.  She does have some symptoms consisting of fatigue, depression, weight gain, hair loss.  Levothyroxine 50 µg per day initiated.  Patient will follow-up in about 6 weeks  with nonfasting lab work to reassess.  08/29/2016--normal thyroid ultrasound.  08/19/2016--routine annual exam.  TSH lightly elevated at 4.26.  Upper limit of normal at 4.20.  Ultrasound of thyroid ordered.  06/15/2015--TSH elevated at 4.51.  Thyroid antibodies were negative.  06/15/2015--patient seen for a routine physical examination and noted to have a mildly elevated TSH of 5.05. Repeat thyroid function tests ordered as well as thyroid antibodies. Patient will follow up on the phone.   • Multiple environmental allergies 6/28/2016    Patient has never had allergy testing. Primarily has seasonal allergies.   • Vitamin D deficiency 8/16/2016         Past Surgical History:   Procedure Laterality Date   • BREAST AUGMENTATION Bilateral 1992   • D & C HYSTEROSCOPY ENDOMETRIAL ABLATION N/A 12/12/2016 12/12/2016--Procedure: DILATATION AND CURETTAGE HYSTEROSCOPY NOVASURE ;  Surgeon: Imelda Brito MD;  Location: [facility];  Service:    • SKIN BIOPSY  03/24/2014 03/24/2014--5 mm punch biopsy performed for suspicious pigmented lesion mid, left back. Pathology returned compound melanocytic nevus with mild architectural disorder and mild cytologic atypia of melanocytes. (Dysplastic nevus, mild). Negative margins.         Allergies   Allergen Reactions   • Sulfa Antibiotics Rash           Current Outpatient Medications:   •  estradiol (VIVELLE-DOT) 0.0375 MG/24HR patch, 1 patch 2 (Two) Times a Week., Disp: , Rfl:   •  levothyroxine (SYNTHROID, LEVOTHROID) 50 MCG tablet, TAKE 1 TABLET BY MOUTH DAILY FOR LOW THYROID, Disp: 90 tablet, Rfl: 3  •  Progesterone (PROMETRIUM) 100 MG capsule, , Disp: , Rfl:   •  zolpidem (AMBIEN) 10 MG tablet, TAKE 1 TABLET BY MOUTH EVERY DAY RPN, Disp: 30 tablet, Rfl: 3  •  ezetimibe-simvastatin (Vytorin) 10-20 MG per tablet, Take 1 p.o. daily for high cholesterol, Disp: 90 tablet, Rfl: 3      Family History   Problem Relation Age of Onset   • Hyperlipidemia Mother    • Hyperlipidemia  "Father    • Diabetes Paternal Uncle         Type 2   • Diabetes Paternal Grandmother         Type 2         Social History     Socioeconomic History   • Marital status:    • Number of children: 0   • Highest education level: Some college, no degree   Tobacco Use   • Smoking status: Former Smoker     Packs/day: 1.00     Years: 12.00     Pack years: 12.00     Types: Cigarettes, Electronic Cigarette     Quit date: 2013     Years since quittin.3   • Smokeless tobacco: Never Used   • Tobacco comment: used vapor prior to quitting    Vaping Use   • Vaping Use: Never used   Substance and Sexual Activity   • Alcohol use: Yes     Comment: Occasionally   • Drug use: No   • Sexual activity: Yes     Partners: Male         Vitals:    10/28/21 0908   BP: 124/70   Pulse: 82   Resp: 18   SpO2: 98%   Weight: 74.8 kg (164 lb 12.8 oz)   Height: 162.6 cm (64\")        Body mass index is 28.29 kg/m².      Physical Exam:    General: Alert and oriented x 3.  No acute distress.  Mildly overweight.  Normal affect.  HEENT: Pupils equal, round, reactive to light; extraocular movements intact; sclerae nonicteric; pharynx, ear canals and TMs normal.  Neck: Without JVD, thyromegaly, bruit, or adenopathy.  Lungs: Clear to auscultation in all fields.  Heart: Regular rate and rhythm without murmur, rub, gallop, or click.  Abdomen: Soft, nontender, without hepatosplenomegaly or hernia.  Bowel sounds normal.  : Deferred.  Rectal: Deferred.  Extremities: Without clubbing, cyanosis, edema, or pulse deficit.  Neurologic: Intact without focal deficit.  Normal station and gait observed during ingress and egress from the examination room.  Skin: Without significant lesion.  Musculoskeletal: Unremarkable.    Lab/other results:    NMR reveals a total cholesterol of 240.  Triglycerides normal at 129.  LDL particle number elevated 1949.  Small LDL particle number elevated at 645.  HDL particle number normal at 32.4.  CMP is normal.  Vitamin D " normal.  Thyroid function test normal.  Urinalysis normal.  Hepatitis C antibody screening is nonreactive.  CBC is normal.    Assessment/Plan:     Diagnosis Plan   1. Routine physical examination     2. Hyperlipidemia  ezetimibe-simvastatin (Vytorin) 10-20 MG per tablet    CK    Comprehensive Metabolic Panel    NMR LipoProfile   3. Primary hypothyroidism     4. Multiple environmental allergies     5. Allergic rhinitis     6. Chronic insomnia     7. Vitamin D deficiency     8. Generalized osteoarthritis of multiple sites     9. Therapeutic drug monitoring     10. Need for influenza vaccination       Patient presents with essentially normal annual physical except for the following issues: She has significant hyperlipidemia that should be treated with medication.  It appears she has failed diet.  Her thyroid is therapeutic.  Her environmental allergies are currently controlled.  She has chronic insomnia for which she needs Ambien which is helpful for her.  I have no evidence that she is abusing this medication.  Her vitamin D is in the normal range which is important given her perimenopausal status.  She has generalized osteoarthritis and her symptoms are currently tolerable.    Several preventative health issues discussed including review of vaccinations and recommendations, including dietary issues, exercise and weight loss.  Safe sex practices discussed.  Patient advised to wear seatbelt whenever driving and avoid texting and driving.  Also advised to look both ways before crossing the street.  She is up-to-date on her Cologuard.  Advised to avoid tobacco products and minimize alcohol consumption.    Plan is as follows: Recommend influenza vaccine.  Recommend Covid booster shot.  Also of suggested the patient contact her insurance company regarding the Shingrix vaccine and if it is covered then she should come in sometime next year to have it administered.  I made her aware to 2 shot vaccination 2 to 6 months apart.   Strongly recommend low carbohydrate diet, exercise, and weight loss.  Start generic Vytorin 10/20, 1 p.o. daily.  I have also recommended that patient should have some myalgias or arthralgias that she can do co-Q10 400 mg/day with food.  She is well aware of this and her  is on the exact same thing.  I will have patient obtain fasting lab work after the first of the year and then follow-up about a week later so we can reassess the situation.  In the meantime she can contact us if she has any problems.  I suspect that the lower dose she probably will not.    Addendum: Patient left without getting influenza vaccine.  We will contact her and remind her to get 1.    Procedures

## 2021-11-08 ENCOUNTER — APPOINTMENT (OUTPATIENT)
Dept: WOMENS IMAGING | Facility: HOSPITAL | Age: 51
End: 2021-11-08

## 2021-11-08 PROCEDURE — 77063 BREAST TOMOSYNTHESIS BI: CPT | Performed by: RADIOLOGY

## 2021-11-08 PROCEDURE — 77067 SCR MAMMO BI INCL CAD: CPT | Performed by: RADIOLOGY

## 2021-12-15 ENCOUNTER — TELEPHONE (OUTPATIENT)
Dept: INTERNAL MEDICINE | Facility: CLINIC | Age: 51
End: 2021-12-15

## 2021-12-15 DIAGNOSIS — F51.04 CHRONIC INSOMNIA: Chronic | ICD-10-CM

## 2021-12-15 NOTE — TELEPHONE ENCOUNTER
Caller: Louann Roberts    Relationship: Self    Best call back number: [phone]     What medication are you requesting: DIFLUCAN     What are your current symptoms: ANTIFUNGAL      Have you had these symptoms before:    [x] Yes  [] No    Have you been treated for these symptoms before:   [x] Yes  [] No    If a prescription is needed, what is your preferred pharmacy and phone number:  [facility]/pharmacy #[id number] - [place], [place] - [address] [phone] [facility] [phone] FX        Additional notes:

## 2021-12-15 NOTE — TELEPHONE ENCOUNTER
This is not enough information.  I need to know exactly what it is she wants and what she wants it for please.  Thank you

## 2021-12-17 DIAGNOSIS — B37.31 VAGINAL CANDIDIASIS: Primary | ICD-10-CM

## 2021-12-17 RX ORDER — FLUCONAZOLE 200 MG/1
TABLET ORAL
Qty: 5 TABLET | Refills: 0 | Status: SHIPPED | OUTPATIENT
Start: 2021-12-17 | End: 2022-01-14

## 2021-12-17 RX ORDER — ZOLPIDEM TARTRATE 10 MG/1
TABLET ORAL
Qty: 30 TABLET | Refills: 3 | Status: SHIPPED | OUTPATIENT
Start: 2021-12-17 | End: 2022-04-27

## 2021-12-17 NOTE — TELEPHONE ENCOUNTER
I am assuming were talking about a vaginal yeast infection.  Tell patient I sent a prescription in for fluconazole to her pharmacy.

## 2022-01-14 DIAGNOSIS — B37.31 VAGINAL YEAST INFECTION: Primary | ICD-10-CM

## 2022-01-14 RX ORDER — FLUCONAZOLE 200 MG/1
TABLET ORAL
Qty: 10 TABLET | Refills: 0 | Status: SHIPPED | OUTPATIENT
Start: 2022-01-14 | End: 2022-02-10

## 2022-01-30 DIAGNOSIS — E78.2 MIXED HYPERLIPIDEMIA: Chronic | ICD-10-CM

## 2022-02-01 ENCOUNTER — LAB (OUTPATIENT)
Dept: LAB | Facility: HOSPITAL | Age: 52
End: 2022-02-01

## 2022-02-01 LAB
ALBUMIN SERPL-MCNC: 4.4 G/DL (ref 3.5–5.2)
ALBUMIN/GLOB SERPL: 1.8 G/DL
ALP SERPL-CCNC: 71 U/L (ref 39–117)
ALT SERPL W P-5'-P-CCNC: 18 U/L (ref 1–33)
ANION GAP SERPL CALCULATED.3IONS-SCNC: 9.2 MMOL/L (ref 5–15)
AST SERPL-CCNC: 18 U/L (ref 1–32)
BILIRUB SERPL-MCNC: 0.5 MG/DL (ref 0–1.2)
BUN SERPL-MCNC: 16 MG/DL (ref 6–20)
BUN/CREAT SERPL: 16.3 (ref 7–25)
CALCIUM SPEC-SCNC: 9.8 MG/DL (ref 8.6–10.5)
CHLORIDE SERPL-SCNC: 102 MMOL/L (ref 98–107)
CK SERPL-CCNC: 116 U/L (ref 20–180)
CO2 SERPL-SCNC: 27.8 MMOL/L (ref 22–29)
CREAT SERPL-MCNC: 0.98 MG/DL (ref 0.57–1)
GFR SERPL CREATININE-BSD FRML MDRD: 60 ML/MIN/1.73
GLOBULIN UR ELPH-MCNC: 2.5 GM/DL
GLUCOSE SERPL-MCNC: 99 MG/DL (ref 65–99)
POTASSIUM SERPL-SCNC: 4.6 MMOL/L (ref 3.5–5.2)
PROT SERPL-MCNC: 6.9 G/DL (ref 6–8.5)
SODIUM SERPL-SCNC: 139 MMOL/L (ref 136–145)

## 2022-02-01 PROCEDURE — 80061 LIPID PANEL: CPT | Performed by: INTERNAL MEDICINE

## 2022-02-01 PROCEDURE — 36415 COLL VENOUS BLD VENIPUNCTURE: CPT

## 2022-02-01 PROCEDURE — 82550 ASSAY OF CK (CPK): CPT | Performed by: INTERNAL MEDICINE

## 2022-02-01 PROCEDURE — 80053 COMPREHEN METABOLIC PANEL: CPT | Performed by: INTERNAL MEDICINE

## 2022-02-01 PROCEDURE — 83704 LIPOPROTEIN BLD QUAN PART: CPT | Performed by: INTERNAL MEDICINE

## 2022-02-02 LAB
CHOLEST SERPL-MCNC: 148 MG/DL (ref 100–199)
HDL SERPL-SCNC: 42 UMOL/L
HDLC SERPL-MCNC: 59 MG/DL
LDL SERPL QN: 20.8 NM
LDL SERPL-SCNC: 703 NMOL/L
LDL SMALL SERPL-SCNC: 360 NMOL/L
LDLC SERPL CALC-MCNC: 68 MG/DL (ref 0–99)
TRIGL SERPL-MCNC: 119 MG/DL (ref 0–149)

## 2022-02-10 ENCOUNTER — OFFICE VISIT (OUTPATIENT)
Dept: INTERNAL MEDICINE | Facility: CLINIC | Age: 52
End: 2022-02-10

## 2022-02-10 VITALS
HEART RATE: 83 BPM | DIASTOLIC BLOOD PRESSURE: 70 MMHG | BODY MASS INDEX: 27.35 KG/M2 | SYSTOLIC BLOOD PRESSURE: 122 MMHG | RESPIRATION RATE: 18 BRPM | OXYGEN SATURATION: 98 % | WEIGHT: 160.2 LBS | HEIGHT: 64 IN

## 2022-02-10 DIAGNOSIS — M15.9 GENERALIZED OSTEOARTHRITIS OF MULTIPLE SITES: Chronic | ICD-10-CM

## 2022-02-10 DIAGNOSIS — F51.04 CHRONIC INSOMNIA: Chronic | ICD-10-CM

## 2022-02-10 DIAGNOSIS — Z23 NEED FOR SHINGLES VACCINE: ICD-10-CM

## 2022-02-10 DIAGNOSIS — E78.2 MIXED HYPERLIPIDEMIA: Primary | Chronic | ICD-10-CM

## 2022-02-10 DIAGNOSIS — Z51.81 THERAPEUTIC DRUG MONITORING: ICD-10-CM

## 2022-02-10 DIAGNOSIS — E55.9 VITAMIN D DEFICIENCY: Chronic | ICD-10-CM

## 2022-02-10 DIAGNOSIS — Z00.00 ROUTINE PHYSICAL EXAMINATION: ICD-10-CM

## 2022-02-10 DIAGNOSIS — E03.9 PRIMARY HYPOTHYROIDISM: Chronic | ICD-10-CM

## 2022-02-10 DIAGNOSIS — F41.1 GENERALIZED ANXIETY DISORDER: Chronic | ICD-10-CM

## 2022-02-10 DIAGNOSIS — Z78.0 POSTMENOPAUSAL STATE: ICD-10-CM

## 2022-02-10 PROCEDURE — 90750 HZV VACC RECOMBINANT IM: CPT | Performed by: INTERNAL MEDICINE

## 2022-02-10 PROCEDURE — 99214 OFFICE O/P EST MOD 30 MIN: CPT | Performed by: INTERNAL MEDICINE

## 2022-02-10 PROCEDURE — 90471 IMMUNIZATION ADMIN: CPT | Performed by: INTERNAL MEDICINE

## 2022-02-10 RX ORDER — MONTELUKAST SODIUM 10 MG/1
10 TABLET ORAL NIGHTLY
COMMUNITY
End: 2022-11-21

## 2022-02-10 RX ORDER — PHENOL 1.4 %
AEROSOL, SPRAY (ML) MUCOUS MEMBRANE
Qty: 30 TABLET
Start: 2022-02-10

## 2022-02-10 NOTE — PROGRESS NOTES
02/10/2022    Patient Information  Louann Roberts                                                                                          [address] [facility] [address]      1970  [unfilled]  There is no work phone number on file.    Chief Complaint:     Follow-up lab work in order to monitor chronic medical issues listed in history of present illness.  Follow-up new medication for cholesterol.    History of Present Illness:    Patient with hyperlipidemia, hypothyroidism, vitamin D deficiency, chronic insomnia requiring medication, generalized anxiety disorder, generalized osteoarthritis.  She presents today for a follow-up with lab prior in order to monitor chronic medical issues, particularly her cholesterol.  We started her on generic Vytorin 10/20 at the last visit.  She seems to be tolerating this well.  Past medical history reviewed and updated were necessary including health maintenance parameters.  This reveals she may need her influenza vaccine and also is due Shingrix vaccine.  We are out of the influenza vaccine and I encouraged her to stop by the drugstore to obtain one.  Also have encouraged her to check with her insurance regarding coverage of the Shingrix vaccine.  I have highly recommended it.    Review of Systems   Constitutional: Negative.   HENT: Negative.    Eyes: Negative.    Cardiovascular: Negative.    Respiratory: Negative.    Endocrine: Negative.    Hematologic/Lymphatic: Negative.    Skin: Negative.    Musculoskeletal: Negative.    Gastrointestinal: Negative.    Genitourinary: Negative.    Neurological: Negative.    Psychiatric/Behavioral: Negative.    Allergic/Immunologic: Negative.        Active Problems:    Patient Active Problem List   Diagnosis   • Allergic rhinitis   • Generalized anxiety disorder   • Hyperlipidemia   • Primary hypothyroidism   • Multiple environmental allergies   • Chronic insomnia   • Vitamin D deficiency   • Therapeutic drug monitoring   •  Routine physical examination   • Generalized osteoarthritis of multiple sites         Past Medical History:   Diagnosis Date   • Allergic rhinitis 6/28/2016    Patient has never had allergy testing. Primarily has seasonal allergies.   • Chronic insomnia 6/28/2016   • Generalized anxiety disorder 6/28/2016   • Generalized osteoarthritis of multiple sites 11/10/2016    11/10/2016--patient reports she's having some problems with joint pain and stiffness particularly in the morning.  This particularly involves her hands and fingers and does seem to get better through the day.  She also has similar symptoms of her knees.  I have recommended Cosamin ASU.   • History of Dysplastic nevus of skin 3/24/2015    03/24/2014--5 mm punch biopsy performed for suspicious pigmented lesion mid, left back. Pathology returned compound melanocytic nevus with mild architectural disorder and mild cytologic atypia of melanocytes. (Dysplastic nevus, mild). Negative margins.   • History of menorrhagia 12/12/2016 12/12/2016--Procedure: DILATATION AND CURETTAGE HYSTEROSCOPY NOVASURE ;  Surgeon: Imelda Brito MD;  Location: [facility];  Service:    • History of migraine 3/18/2014    03/18/2014--patient reports that her migraine headaches have essentially resolved. She has not had one for several years.   • History of routine gynecologic exam yearly    Patient sees a gynecologist.   • Hyperlipidemia 2/26/2014 02/26/2014--total cholesterol 206, triglycerides normal at 86, LDL cholesterol 135, HDL cholesterol 54. Recommend diet and exercise. Reevaluated in a few months with an NMR.   • Hypothyroidism 6/15/2015    09/13/2016--patient seen in follow-up and the results of the thyroid ultrasound discussed.  She has had an elevated TSH on several occasions over the past year or more.  She does have some symptoms consisting of fatigue, depression, weight gain, hair loss.  Levothyroxine 50 µg per day initiated.  Patient will follow-up in about  6 weeks with nonfasting lab work to reassess.  08/29/2016--normal thyroid ultrasound.  08/19/2016--routine annual exam.  TSH lightly elevated at 4.26.  Upper limit of normal at 4.20.  Ultrasound of thyroid ordered.  06/15/2015--TSH elevated at 4.51.  Thyroid antibodies were negative.  06/15/2015--patient seen for a routine physical examination and noted to have a mildly elevated TSH of 5.05. Repeat thyroid function tests ordered as well as thyroid antibodies. Patient will follow up on the phone.   • Multiple environmental allergies 6/28/2016    Patient has never had allergy testing. Primarily has seasonal allergies.   • Vitamin D deficiency 8/16/2016         Past Surgical History:   Procedure Laterality Date   • BREAST AUGMENTATION Bilateral 1992   • D & C HYSTEROSCOPY ENDOMETRIAL ABLATION N/A 12/12/2016 12/12/2016--Procedure: DILATATION AND CURETTAGE HYSTEROSCOPY NOVASURE ;  Surgeon: Imelda Brito MD;  Location: [facility];  Service:    • SKIN BIOPSY  03/24/2014 03/24/2014--5 mm punch biopsy performed for suspicious pigmented lesion mid, left back. Pathology returned compound melanocytic nevus with mild architectural disorder and mild cytologic atypia of melanocytes. (Dysplastic nevus, mild). Negative margins.         Allergies   Allergen Reactions   • Sulfa Antibiotics Rash           Current Outpatient Medications:   •  ezetimibe-simvastatin (Vytorin) 10-20 MG per tablet, Take 1 p.o. daily for high cholesterol, Disp: 90 tablet, Rfl: 3  •  levothyroxine (SYNTHROID, LEVOTHROID) 50 MCG tablet, TAKE 1 TABLET BY MOUTH DAILY FOR LOW THYROID, Disp: 90 tablet, Rfl: 3  •  montelukast (SINGULAIR) 10 MG tablet, Take 10 mg by mouth Every Night., Disp: , Rfl:   •  zolpidem (AMBIEN) 10 MG tablet, TAKE 1 TABLET BY MOUTH EVERY DAY AS NEEDED, Disp: 30 tablet, Rfl: 3  •  calcium carbonate (Calcium 600) 600 MG tablet, Take 1200 to 1800 mg of calcium daily, Disp: 30 tablet, Rfl:   •  vitamin D3 125 MCG (5000 UT) capsule  "capsule, 1 by mouth daily as directed, Disp: 30 capsule, Rfl:       Family History   Problem Relation Age of Onset   • Hyperlipidemia Mother    • Hyperlipidemia Father    • Diabetes Paternal Uncle         Type 2   • Diabetes Paternal Grandmother         Type 2         Social History     Socioeconomic History   • Marital status:    • Number of children: 0   • Highest education level: Some college, no degree   Tobacco Use   • Smoking status: Former Smoker     Packs/day: 1.00     Years: 12.00     Pack years: 12.00     Types: Cigarettes, Electronic Cigarette     Quit date: 2013     Years since quittin.6   • Smokeless tobacco: Never Used   • Tobacco comment: used vapor prior to quitting    Vaping Use   • Vaping Use: Never used   Substance and Sexual Activity   • Alcohol use: Yes     Comment: Occasionally   • Drug use: No   • Sexual activity: Yes     Partners: Male         Vitals:    02/10/22 1006   BP: 122/70   Pulse: 83   Resp: 18   SpO2: 98%   Weight: 72.7 kg (160 lb 3.2 oz)   Height: 162.6 cm (64\")        Body mass index is 27.5 kg/m².      Physical Exam:    General: Alert and oriented x 3.  No acute distress.  Mildly overweight.  Normal affect.  HEENT: Pupils equal, round, reactive to light; extraocular movements intact; sclerae nonicteric; pharynx, ear canals and TMs normal.  Neck: Without JVD, thyromegaly, bruit, or adenopathy.  Lungs: Clear to auscultation in all fields.  Heart: Regular rate and rhythm without murmur, rub, gallop, or click.  Abdomen: Soft, nontender, without hepatosplenomegaly or hernia.  Bowel sounds normal.  : Deferred.  Rectal: Deferred.  Extremities: Without clubbing, cyanosis, edema, or pulse deficit.  Neurologic: Intact without focal deficit.  Normal station and gait observed during ingress and egress from the examination room.  Skin: Without significant lesion.  Musculoskeletal: Unremarkable.    Lab/other results:    CPK is normal.  CMP normal.  NMR is perfect with a total " cholesterol 148.  Triglycerides 119.  LDL particle #703.  Small LDL particle #360.  HDL particle #42.0.    Assessment/Plan:     Diagnosis Plan   1. Hyperlipidemia  CK    Comprehensive Metabolic Panel    NMR LipoProfile   2. Primary hypothyroidism  TSH    T4, Free    T3, Free   3. Vitamin D deficiency  vitamin D3 125 MCG (5000 UT) capsule capsule    Vitamin D 25 Hydroxy   4. Chronic insomnia     5. Generalized anxiety disorder     6. Generalized osteoarthritis of multiple sites     7. Therapeutic drug monitoring  CBC (No Diff)    Urinalysis With Microscopic If Indicated (No Culture) - Urine, Clean Catch   8. Routine physical examination  CBC (No Diff)    CK    Comprehensive Metabolic Panel    NMR LipoProfile    Vitamin D 25 Hydroxy    Urinalysis With Microscopic If Indicated (No Culture) - Urine, Clean Catch    TSH    T4, Free    T3, Free   9. Postmenopausal state  calcium carbonate (Calcium 600) 600 MG tablet   10. Need for shingles vaccine  Shingrix Vaccine     Patient has hyperlipidemia which is under excellent control with Vytorin which we just initiated few months ago.  She seems to be tolerating it well.  Her thyroid has been therapeutic on levothyroxine 50 mcg/day.  She also is taking vitamin D supplementation and her medication list is updated to reflect this.  Her generalized anxiety seems to be under reasonable control without medication.  She does have chronic insomnia requiring Ambien and I have no evidence she is abusing this medication.  She also has osteoarthritis and expected aches and pains that are tolerable.    Plan is as follows: No change in current medical regimen.  Patient will follow-up after October 28, 2022 with lab prior for her annual physical.  Otherwise she will follow-up as needed.  I have encouraged patient to check with her insurance regarding the coverage of the new Shingrix vaccine and also have recommended influenza vaccine.    Addendum: Patient has check with her insurance company  and the Shingrix vaccination is covered.  Administered today.  Patient is aware she needs her second vaccine anywhere between 2 to 6 months.    Procedures

## 2022-04-27 DIAGNOSIS — F51.04 CHRONIC INSOMNIA: Chronic | ICD-10-CM

## 2022-04-27 RX ORDER — ZOLPIDEM TARTRATE 10 MG/1
TABLET ORAL
Qty: 30 TABLET | Refills: 5 | Status: SHIPPED | OUTPATIENT
Start: 2022-04-27 | End: 2022-11-21 | Stop reason: SDUPTHER

## 2022-05-25 ENCOUNTER — CLINICAL SUPPORT (OUTPATIENT)
Dept: INTERNAL MEDICINE | Facility: CLINIC | Age: 52
End: 2022-05-25

## 2022-05-25 DIAGNOSIS — Z23 NEED FOR VACCINATION: Primary | ICD-10-CM

## 2022-05-25 PROCEDURE — 90750 HZV VACC RECOMBINANT IM: CPT | Performed by: INTERNAL MEDICINE

## 2022-05-25 PROCEDURE — 90471 IMMUNIZATION ADMIN: CPT | Performed by: INTERNAL MEDICINE

## 2022-06-03 ENCOUNTER — OFFICE VISIT (OUTPATIENT)
Dept: INTERNAL MEDICINE | Facility: CLINIC | Age: 52
End: 2022-06-03

## 2022-06-03 VITALS
SYSTOLIC BLOOD PRESSURE: 126 MMHG | DIASTOLIC BLOOD PRESSURE: 74 MMHG | HEART RATE: 74 BPM | WEIGHT: 165.8 LBS | BODY MASS INDEX: 28.31 KG/M2 | RESPIRATION RATE: 18 BRPM | OXYGEN SATURATION: 98 % | HEIGHT: 64 IN

## 2022-06-03 DIAGNOSIS — M25.561 CHRONIC PAIN OF RIGHT KNEE: Primary | ICD-10-CM

## 2022-06-03 DIAGNOSIS — M17.11 PATELLOFEMORAL ARTHRITIS OF RIGHT KNEE: ICD-10-CM

## 2022-06-03 DIAGNOSIS — G89.29 CHRONIC PAIN OF RIGHT KNEE: Primary | ICD-10-CM

## 2022-06-03 PROCEDURE — 99213 OFFICE O/P EST LOW 20 MIN: CPT | Performed by: INTERNAL MEDICINE

## 2022-06-03 RX ORDER — MELOXICAM 15 MG/1
TABLET ORAL
Qty: 30 TABLET | Refills: 1 | Status: SHIPPED | OUTPATIENT
Start: 2022-06-03 | End: 2022-08-01

## 2022-06-03 RX ORDER — ESTRADIOL 0.04 MG/D
1 FILM, EXTENDED RELEASE TRANSDERMAL 2 TIMES WEEKLY
COMMUNITY
Start: 2022-04-14 | End: 2022-11-21 | Stop reason: SDUPTHER

## 2022-06-03 NOTE — PROGRESS NOTES
06/03/2022    Patient Information  Louann Roberts                                                                                          [address] [facility] [address]      1970  [unfilled]  There is no work phone number on file.    Chief Complaint:     Complaining of worsening right knee pain.    History of Present Illness:    Patient with a history of chronic but intermittent right knee pain as well as history of left knee pain presents with rather sudden onset worsening of the right knee pain without known injury.  Patient had been playing with children rather aggressively prior to the onset of this but does not remember any specific injury.  She reports that her kneecap seems to track irregularly and slipped sideways somewhat.  She does not describe any overt dislocation.  She has had no redness but there has been some slight swelling.    Review of Systems   Constitutional: Negative.   HENT: Negative.    Eyes: Negative.    Cardiovascular: Negative.    Respiratory: Negative.    Endocrine: Negative.    Hematologic/Lymphatic: Negative.    Skin: Negative.    Musculoskeletal: Positive for joint pain.        Right knee pain   Gastrointestinal: Negative.    Genitourinary: Negative.    Neurological: Negative.    Psychiatric/Behavioral: Negative.    Allergic/Immunologic: Negative.        Active Problems:    Patient Active Problem List   Diagnosis   • Allergic rhinitis   • Generalized anxiety disorder   • Hyperlipidemia   • Primary hypothyroidism   • Multiple environmental allergies   • Chronic insomnia   • Vitamin D deficiency   • Therapeutic drug monitoring   • Routine physical examination   • Generalized osteoarthritis of multiple sites   • Chronic pain of right knee   • Patellofemoral arthritis of right knee         Past Medical History:   Diagnosis Date   • Allergic rhinitis 6/28/2016    Patient has never had allergy testing. Primarily has seasonal allergies.   • Chronic insomnia 6/28/2016    • Generalized anxiety disorder 6/28/2016   • Generalized osteoarthritis of multiple sites 11/10/2016    11/10/2016--patient reports she's having some problems with joint pain and stiffness particularly in the morning.  This particularly involves her hands and fingers and does seem to get better through the day.  She also has similar symptoms of her knees.  I have recommended Cosamin ASU.   • History of Dysplastic nevus of skin 3/24/2015    03/24/2014--5 mm punch biopsy performed for suspicious pigmented lesion mid, left back. Pathology returned compound melanocytic nevus with mild architectural disorder and mild cytologic atypia of melanocytes. (Dysplastic nevus, mild). Negative margins.   • History of menorrhagia 12/12/2016 12/12/2016--Procedure: DILATATION AND CURETTAGE HYSTEROSCOPY NOVASURE ;  Surgeon: Imelda Brito MD;  Location: [facility];  Service:    • History of migraine 3/18/2014    03/18/2014--patient reports that her migraine headaches have essentially resolved. She has not had one for several years.   • History of routine gynecologic exam yearly    Patient sees a gynecologist.   • Hyperlipidemia 2/26/2014 02/26/2014--total cholesterol 206, triglycerides normal at 86, LDL cholesterol 135, HDL cholesterol 54. Recommend diet and exercise. Reevaluated in a few months with an NMR.   • Hypothyroidism 6/15/2015    09/13/2016--patient seen in follow-up and the results of the thyroid ultrasound discussed.  She has had an elevated TSH on several occasions over the past year or more.  She does have some symptoms consisting of fatigue, depression, weight gain, hair loss.  Levothyroxine 50 µg per day initiated.  Patient will follow-up in about 6 weeks with nonfasting lab work to reassess.  08/29/2016--normal thyroid ultrasound.  08/19/2016--routine annual exam.  TSH lightly elevated at 4.26.  Upper limit of normal at 4.20.  Ultrasound of thyroid ordered.  06/15/2015--TSH elevated at 4.51.  Thyroid  antibodies were negative.  06/15/2015--patient seen for a routine physical examination and noted to have a mildly elevated TSH of 5.05. Repeat thyroid function tests ordered as well as thyroid antibodies. Patient will follow up on the phone.   • Multiple environmental allergies 6/28/2016    Patient has never had allergy testing. Primarily has seasonal allergies.   • Vitamin D deficiency 8/16/2016         Past Surgical History:   Procedure Laterality Date   • BREAST AUGMENTATION Bilateral 1992   • D & C HYSTEROSCOPY ENDOMETRIAL ABLATION N/A 12/12/2016 12/12/2016--Procedure: DILATATION AND CURETTAGE HYSTEROSCOPY NOVASURE ;  Surgeon: Imelda Brito MD;  Location: [facility];  Service:    • SKIN BIOPSY  03/24/2014 03/24/2014--5 mm punch biopsy performed for suspicious pigmented lesion mid, left back. Pathology returned compound melanocytic nevus with mild architectural disorder and mild cytologic atypia of melanocytes. (Dysplastic nevus, mild). Negative margins.         Allergies   Allergen Reactions   • Sulfa Antibiotics Rash           Current Outpatient Medications:   •  calcium carbonate (Calcium 600) 600 MG tablet, Take 1200 to 1800 mg of calcium daily, Disp: 30 tablet, Rfl:   •  ezetimibe-simvastatin (Vytorin) 10-20 MG per tablet, Take 1 p.o. daily for high cholesterol, Disp: 90 tablet, Rfl: 3  •  levothyroxine (SYNTHROID, LEVOTHROID) 50 MCG tablet, TAKE 1 TABLET BY MOUTH DAILY FOR LOW THYROID, Disp: 90 tablet, Rfl: 3  •  montelukast (SINGULAIR) 10 MG tablet, Take 10 mg by mouth Every Night., Disp: , Rfl:   •  vitamin D3 125 MCG (5000 UT) capsule capsule, 1 by mouth daily as directed, Disp: 30 capsule, Rfl:   •  zolpidem (AMBIEN) 10 MG tablet, TAKE 1 TABLET BY MOUTH EVERY DAY AS NEEDED, Disp: 30 tablet, Rfl: 5  •  estradiol (VIVELLE-DOT) 0.0375 MG/24HR patch, 1 patch 2 (Two) Times a Week., Disp: , Rfl:   •  meloxicam (MOBIC) 15 MG tablet, Take 1 p.o. daily for arthritis pain, Disp: 30 tablet, Rfl:  "1      Family History   Problem Relation Age of Onset   • Hyperlipidemia Mother    • Hyperlipidemia Father    • Diabetes Paternal Uncle         Type 2   • Diabetes Paternal Grandmother         Type 2         Social History     Socioeconomic History   • Marital status:    • Number of children: 0   • Highest education level: Some college, no degree   Tobacco Use   • Smoking status: Former Smoker     Packs/day: 1.00     Years: 12.00     Pack years: 12.00     Types: Cigarettes, Electronic Cigarette     Quit date: 2013     Years since quittin.9   • Smokeless tobacco: Never Used   • Tobacco comment: used vapor prior to quitting    Vaping Use   • Vaping Use: Never used   Substance and Sexual Activity   • Alcohol use: Yes     Comment: Occasionally   • Drug use: No   • Sexual activity: Yes     Partners: Male         Vitals:    22 1351   BP: 126/74   Pulse: 74   Resp: 18   SpO2: 98%   Weight: 75.2 kg (165 lb 12.8 oz)   Height: 162.6 cm (64\")        Body mass index is 28.46 kg/m².      Physical Exam:    Examination limited to the right knee.  Right knee does not appear overtly swollen.  There is fairly good range of motion.  The patella and patella tendons seem a little lax.  No redness or warmth.    Lab/other results:      Assessment/Plan:     Diagnosis Plan   1. Chronic pain of right knee  Ambulatory Referral to Orthopedic Surgery    meloxicam (MOBIC) 15 MG tablet   2. Patellofemoral arthritis of right knee  Ambulatory Referral to Orthopedic Surgery    meloxicam (MOBIC) 15 MG tablet     Patient with chronic pain of the right knee presents with worsening pain.  Her patella seems loose and I think she has some element of patellofemoral arthritis.  I think she needs to be evaluated by an orthopedist.    Plan is as follows: Start meloxicam 15 mg/day.  Patient referred to orthopedics.        Procedures        "

## 2022-07-31 DIAGNOSIS — G89.29 CHRONIC PAIN OF RIGHT KNEE: ICD-10-CM

## 2022-07-31 DIAGNOSIS — M17.11 PATELLOFEMORAL ARTHRITIS OF RIGHT KNEE: ICD-10-CM

## 2022-07-31 DIAGNOSIS — M25.561 CHRONIC PAIN OF RIGHT KNEE: ICD-10-CM

## 2022-08-01 RX ORDER — MELOXICAM 15 MG/1
TABLET ORAL
Qty: 30 TABLET | Refills: 1 | Status: SHIPPED | OUTPATIENT
Start: 2022-08-01 | End: 2022-10-19

## 2022-08-06 DIAGNOSIS — E03.9 PRIMARY HYPOTHYROIDISM: Chronic | ICD-10-CM

## 2022-08-08 RX ORDER — LEVOTHYROXINE SODIUM 0.05 MG/1
TABLET ORAL
Qty: 90 TABLET | Refills: 2 | Status: SHIPPED | OUTPATIENT
Start: 2022-08-08 | End: 2022-11-21 | Stop reason: SDUPTHER

## 2022-09-07 DIAGNOSIS — E78.2 MIXED HYPERLIPIDEMIA: Chronic | ICD-10-CM

## 2022-09-07 RX ORDER — EZETIMIBE AND SIMVASTATIN 10; 20 MG/1; MG/1
TABLET ORAL
Qty: 90 TABLET | Refills: 3 | Status: SHIPPED | OUTPATIENT
Start: 2022-09-07 | End: 2022-11-21 | Stop reason: SDUPTHER

## 2022-10-19 DIAGNOSIS — M25.561 CHRONIC PAIN OF RIGHT KNEE: ICD-10-CM

## 2022-10-19 DIAGNOSIS — M17.11 PATELLOFEMORAL ARTHRITIS OF RIGHT KNEE: ICD-10-CM

## 2022-10-19 DIAGNOSIS — G89.29 CHRONIC PAIN OF RIGHT KNEE: ICD-10-CM

## 2022-10-19 RX ORDER — MELOXICAM 15 MG/1
TABLET ORAL
Qty: 30 TABLET | Refills: 2 | Status: SHIPPED | OUTPATIENT
Start: 2022-10-19 | End: 2022-11-21

## 2022-10-25 DIAGNOSIS — E03.9 PRIMARY HYPOTHYROIDISM: Chronic | ICD-10-CM

## 2022-10-25 DIAGNOSIS — Z00.00 ROUTINE PHYSICAL EXAMINATION: ICD-10-CM

## 2022-10-25 DIAGNOSIS — E55.9 VITAMIN D DEFICIENCY: Chronic | ICD-10-CM

## 2022-10-25 DIAGNOSIS — E78.2 MIXED HYPERLIPIDEMIA: Chronic | ICD-10-CM

## 2022-10-25 DIAGNOSIS — Z51.81 THERAPEUTIC DRUG MONITORING: ICD-10-CM

## 2022-11-14 ENCOUNTER — APPOINTMENT (OUTPATIENT)
Dept: WOMENS IMAGING | Facility: HOSPITAL | Age: 52
End: 2022-11-14

## 2022-11-14 PROCEDURE — 77067 SCR MAMMO BI INCL CAD: CPT | Performed by: RADIOLOGY

## 2022-11-14 PROCEDURE — 77063 BREAST TOMOSYNTHESIS BI: CPT | Performed by: RADIOLOGY

## 2022-11-18 LAB
25(OH)D3+25(OH)D2 SERPL-MCNC: 58.4 NG/ML (ref 30–100)
ALBUMIN SERPL-MCNC: 4.5 G/DL (ref 3.5–5.2)
ALBUMIN/GLOB SERPL: 2.3 G/DL
ALP SERPL-CCNC: 71 U/L (ref 39–117)
ALT SERPL-CCNC: 22 U/L (ref 1–33)
APPEARANCE UR: CLEAR
AST SERPL-CCNC: 19 U/L (ref 1–32)
BILIRUB SERPL-MCNC: 0.4 MG/DL (ref 0–1.2)
BILIRUB UR QL STRIP: NEGATIVE
BUN SERPL-MCNC: 13 MG/DL (ref 6–20)
BUN/CREAT SERPL: 18.3 (ref 7–25)
CALCIUM SERPL-MCNC: 9.7 MG/DL (ref 8.6–10.5)
CHLORIDE SERPL-SCNC: 101 MMOL/L (ref 98–107)
CHOLEST SERPL-MCNC: 133 MG/DL (ref 100–199)
CK SERPL-CCNC: 170 U/L (ref 20–180)
CO2 SERPL-SCNC: 26.6 MMOL/L (ref 22–29)
COLOR UR: YELLOW
CREAT SERPL-MCNC: 0.71 MG/DL (ref 0.57–1)
EGFRCR SERPLBLD CKD-EPI 2021: 102.5 ML/MIN/1.73
ERYTHROCYTE [DISTWIDTH] IN BLOOD BY AUTOMATED COUNT: 12.4 % (ref 12.3–15.4)
GLOBULIN SER CALC-MCNC: 2 GM/DL
GLUCOSE SERPL-MCNC: 99 MG/DL (ref 65–99)
GLUCOSE UR QL STRIP: NEGATIVE
HCT VFR BLD AUTO: 39.7 % (ref 34–46.6)
HDL SERPL-SCNC: 44.7 UMOL/L
HDLC SERPL-MCNC: 58 MG/DL
HGB BLD-MCNC: 13.2 G/DL (ref 12–15.9)
HGB UR QL STRIP: NEGATIVE
KETONES UR QL STRIP: NEGATIVE
LDL SERPL QN: 19.7 NM
LDL SERPL-SCNC: 677 NMOL/L
LDL SMALL SERPL-SCNC: 507 NMOL/L
LDLC SERPL CALC-MCNC: 56 MG/DL (ref 0–99)
LEUKOCYTE ESTERASE UR QL STRIP: NEGATIVE
MCH RBC QN AUTO: 29.1 PG (ref 26.6–33)
MCHC RBC AUTO-ENTMCNC: 33.2 G/DL (ref 31.5–35.7)
MCV RBC AUTO: 87.4 FL (ref 79–97)
NITRITE UR QL STRIP: NEGATIVE
PH UR STRIP: 7 [PH] (ref 5–8)
PLATELET # BLD AUTO: 252 10*3/MM3 (ref 140–450)
POTASSIUM SERPL-SCNC: 4.6 MMOL/L (ref 3.5–5.2)
PROT SERPL-MCNC: 6.5 G/DL (ref 6–8.5)
PROT UR QL STRIP: NEGATIVE
RBC # BLD AUTO: 4.54 10*6/MM3 (ref 3.77–5.28)
SODIUM SERPL-SCNC: 136 MMOL/L (ref 136–145)
SP GR UR STRIP: NORMAL (ref 1–1.03)
T3FREE SERPL-MCNC: 2.8 PG/ML (ref 2–4.4)
T4 FREE SERPL-MCNC: 1.34 NG/DL (ref 0.93–1.7)
TRIGL SERPL-MCNC: 106 MG/DL (ref 0–149)
TSH SERPL DL<=0.005 MIU/L-ACNC: 2.49 UIU/ML (ref 0.27–4.2)
UROBILINOGEN UR STRIP-MCNC: NORMAL MG/DL
WBC # BLD AUTO: 6.25 10*3/MM3 (ref 3.4–10.8)

## 2022-11-21 ENCOUNTER — OFFICE VISIT (OUTPATIENT)
Dept: INTERNAL MEDICINE | Facility: CLINIC | Age: 52
End: 2022-11-21

## 2022-11-21 VITALS
HEIGHT: 64 IN | HEART RATE: 81 BPM | WEIGHT: 163 LBS | OXYGEN SATURATION: 98 % | SYSTOLIC BLOOD PRESSURE: 114 MMHG | BODY MASS INDEX: 27.83 KG/M2 | RESPIRATION RATE: 18 BRPM | DIASTOLIC BLOOD PRESSURE: 62 MMHG

## 2022-11-21 DIAGNOSIS — E55.9 VITAMIN D DEFICIENCY: Chronic | ICD-10-CM

## 2022-11-21 DIAGNOSIS — Z51.81 THERAPEUTIC DRUG MONITORING: ICD-10-CM

## 2022-11-21 DIAGNOSIS — E66.3 OVERWEIGHT (BMI 25.0-29.9): ICD-10-CM

## 2022-11-21 DIAGNOSIS — N95.1 MENOPAUSAL SYMPTOMS: ICD-10-CM

## 2022-11-21 DIAGNOSIS — F51.04 CHRONIC INSOMNIA: Chronic | ICD-10-CM

## 2022-11-21 DIAGNOSIS — Z23 NEED FOR INFLUENZA VACCINATION: ICD-10-CM

## 2022-11-21 DIAGNOSIS — Z91.09 MULTIPLE ENVIRONMENTAL ALLERGIES: Chronic | ICD-10-CM

## 2022-11-21 DIAGNOSIS — J30.1 CHRONIC SEASONAL ALLERGIC RHINITIS DUE TO POLLEN: Chronic | ICD-10-CM

## 2022-11-21 DIAGNOSIS — Z00.00 ROUTINE PHYSICAL EXAMINATION: Primary | ICD-10-CM

## 2022-11-21 DIAGNOSIS — M15.9 GENERALIZED OSTEOARTHRITIS OF MULTIPLE SITES: Chronic | ICD-10-CM

## 2022-11-21 DIAGNOSIS — F41.1 GENERALIZED ANXIETY DISORDER: Chronic | ICD-10-CM

## 2022-11-21 DIAGNOSIS — E03.9 PRIMARY HYPOTHYROIDISM: Chronic | ICD-10-CM

## 2022-11-21 DIAGNOSIS — E78.2 MIXED HYPERLIPIDEMIA: Chronic | ICD-10-CM

## 2022-11-21 PROBLEM — M17.11 PATELLOFEMORAL ARTHRITIS OF RIGHT KNEE: Chronic | Status: ACTIVE | Noted: 2022-06-03

## 2022-11-21 PROBLEM — M25.561 CHRONIC PAIN OF RIGHT KNEE: Chronic | Status: ACTIVE | Noted: 2022-06-03

## 2022-11-21 PROBLEM — G89.29 CHRONIC PAIN OF RIGHT KNEE: Chronic | Status: ACTIVE | Noted: 2022-06-03

## 2022-11-21 PROCEDURE — 99396 PREV VISIT EST AGE 40-64: CPT | Performed by: INTERNAL MEDICINE

## 2022-11-21 RX ORDER — ESTRADIOL 0.04 MG/D
FILM, EXTENDED RELEASE TRANSDERMAL
Start: 2022-11-21

## 2022-11-21 RX ORDER — PROGESTERONE 100 MG/1
CAPSULE ORAL
Qty: 20 CAPSULE
Start: 2022-11-21

## 2022-11-21 RX ORDER — LEVOTHYROXINE SODIUM 0.05 MG/1
TABLET ORAL
Qty: 90 TABLET | Refills: 3 | Status: SHIPPED | OUTPATIENT
Start: 2022-11-21

## 2022-11-21 RX ORDER — ZOLPIDEM TARTRATE 10 MG/1
TABLET ORAL
Qty: 30 TABLET | Refills: 5
Start: 2022-11-21 | End: 2023-01-25

## 2022-11-21 RX ORDER — EZETIMIBE AND SIMVASTATIN 10; 20 MG/1; MG/1
TABLET ORAL
Qty: 90 TABLET | Refills: 3 | Status: SHIPPED | OUTPATIENT
Start: 2022-11-21

## 2022-11-21 NOTE — PROGRESS NOTES
11/21/2022    Patient Information  Louann Roberts                                                                                          [address] [facility] [address]      1970  [unfilled]  There is no work phone number on file.    Chief Complaint:     Routine physical examination follow-up blood work.  No new acute complaints.    History of Present Illness:    Patient with hyperlipidemia, hypothyroidism, environmental allergies, allergic rhinitis, vitamin D deficiency, chronic insomnia, generalized anxiety disorder, generalized osteoarthritis multiple sites.  She presents today for routine annual physical exam and follow-up blood work.  Her past medical history reviewed and updated were necessary including health maintenance parameters.  This reveals she needs influenza vaccine.  See below.    Review of Systems   Constitutional: Negative.   HENT: Negative.    Eyes: Negative.    Cardiovascular: Negative.    Respiratory: Negative.    Endocrine: Negative.    Hematologic/Lymphatic: Negative.    Skin: Negative.    Musculoskeletal: Negative.    Gastrointestinal: Negative.    Genitourinary: Negative.    Neurological: Negative.    Psychiatric/Behavioral: Negative.    Allergic/Immunologic: Negative.        Active Problems:    Patient Active Problem List   Diagnosis   • Allergic rhinitis   • Hyperlipidemia   • Primary hypothyroidism   • Multiple environmental allergies   • Chronic insomnia   • Vitamin D deficiency   • Therapeutic drug monitoring   • Routine physical examination   • Generalized osteoarthritis of multiple sites   • Chronic pain of right knee   • Patellofemoral arthritis of right knee   • Overweight (BMI 25.0-29.9)         Past Medical History:   Diagnosis Date   • Allergic rhinitis 06/28/2016    Patient has never had allergy testing. Primarily has seasonal allergies.   • Chronic insomnia 06/28/2016   • Chronic pain of right knee 06/03/2022   • Generalized osteoarthritis of multiple  sites 11/10/2016    11/10/2016--patient reports she's having some problems with joint pain and stiffness particularly in the morning.  This particularly involves her hands and fingers and does seem to get better through the day.  She also has similar symptoms of her knees.  I have recommended Cosamin ASU.   • History of Dysplastic nevus of skin 03/24/2015 03/24/2014--5 mm punch biopsy performed for suspicious pigmented lesion mid, left back. Pathology returned compound melanocytic nevus with mild architectural disorder and mild cytologic atypia of melanocytes. (Dysplastic nevus, mild). Negative margins.   • History of menorrhagia 12/12/2016 12/12/2016--Procedure: DILATATION AND CURETTAGE HYSTEROSCOPY NOVASURE ;  Surgeon: Imelda Brito MD;  Location: [facility];  Service:    • History of migraine 03/18/2014 03/18/2014--patient reports that her migraine headaches have essentially resolved. She has not had one for several years.   • History of routine gynecologic exam yearly    Patient sees a gynecologist.   • Hyperlipidemia 02/26/2014 02/26/2014--total cholesterol 206, triglycerides normal at 86, LDL cholesterol 135, HDL cholesterol 54. Recommend diet and exercise. Reevaluated in a few months with an NMR.   • Hypothyroidism 06/15/2015    09/13/2016--patient seen in follow-up and the results of the thyroid ultrasound discussed.  She has had an elevated TSH on several occasions over the past year or more.  She does have some symptoms consisting of fatigue, depression, weight gain, hair loss.  Levothyroxine 50 µg per day initiated.  Patient will follow-up in about 6 weeks with nonfasting lab work to reassess.  08/29/2016--normal thyroid ultrasound.  08/19/2016--routine annual exam.  TSH lightly elevated at 4.26.  Upper limit of normal at 4.20.  Ultrasound of thyroid ordered.  06/15/2015--TSH elevated at 4.51.  Thyroid antibodies were negative.  06/15/2015--patient seen for a routine physical examination  and noted to have a mildly elevated TSH of 5.05. Repeat thyroid function tests ordered as well as thyroid antibodies. Patient will follow up on the phone.   • Multiple environmental allergies 06/28/2016    Patient has never had allergy testing. Primarily has seasonal allergies.   • Patellofemoral arthritis of right knee 06/03/2022   • Vitamin D deficiency 08/16/2016         Past Surgical History:   Procedure Laterality Date   • BREAST AUGMENTATION Bilateral 1992   • D & C HYSTEROSCOPY ENDOMETRIAL ABLATION N/A 12/12/2016 12/12/2016--Procedure: DILATATION AND CURETTAGE HYSTEROSCOPY NOVASURE ;  Surgeon: Imelda Brito MD;  Location: [facility];  Service:    • SKIN BIOPSY  03/24/2014 03/24/2014--5 mm punch biopsy performed for suspicious pigmented lesion mid, left back. Pathology returned compound melanocytic nevus with mild architectural disorder and mild cytologic atypia of melanocytes. (Dysplastic nevus, mild). Negative margins.         Allergies   Allergen Reactions   • Sulfa Antibiotics Rash           Current Outpatient Medications:   •  calcium carbonate (Calcium 600) 600 MG tablet, Take 1200 to 1800 mg of calcium daily, Disp: 30 tablet, Rfl:   •  estradiol (VIVELLE-DOT) 0.0375 MG/24HR patch, Apply patch as directed twice weekly, Disp: , Rfl:   •  ezetimibe-simvastatin (VYTORIN) 10-20 MG per tablet, Take 1 p.o. daily for high cholesterol., Disp: 90 tablet, Rfl: 3  •  levothyroxine (SYNTHROID, LEVOTHROID) 50 MCG tablet, Take 1 p.o. daily for low thyroid, Disp: 90 tablet, Rfl: 3  •  vitamin D3 125 MCG (5000 UT) capsule capsule, 1 by mouth daily as directed, Disp: 30 capsule, Rfl:   •  zolpidem (AMBIEN) 10 MG tablet, Take 1 p.o. nightly as needed insomnia, Disp: 30 tablet, Rfl: 5  •  Progesterone (PROMETRIUM) 100 MG capsule, Take 1 p.o. daily as directed for menopause, Disp: 20 capsule, Rfl:       Family History   Problem Relation Age of Onset   • Hyperlipidemia Mother    • Hyperlipidemia Father    •  "Diabetes Paternal Uncle         Type 2   • Diabetes Paternal Grandmother         Type 2         Social History     Socioeconomic History   • Marital status:    • Number of children: 0   • Highest education level: Some college, no degree   Tobacco Use   • Smoking status: Former     Packs/day: 1.00     Years: 12.00     Pack years: 12.00     Types: Cigarettes, Electronic Cigarette     Quit date: 2013     Years since quittin.3   • Smokeless tobacco: Never   • Tobacco comments:     used vapor prior to quitting    Vaping Use   • Vaping Use: Never used   Substance and Sexual Activity   • Alcohol use: Yes     Comment: Occasionally   • Drug use: No   • Sexual activity: Yes     Partners: Male         Vitals:    22 1310   BP: 114/62   Pulse: 81   Resp: 18   SpO2: 98%   Weight: 73.9 kg (163 lb)   Height: 162.6 cm (64\")        Body mass index is 27.98 kg/m².      Physical Exam:    General: Alert and oriented x 3.  No acute distress.  Overweight.  Normal affect.  HEENT: Pupils equal, round, reactive to light; extraocular movements intact; sclerae nonicteric; pharynx, ear canals and TMs normal.  Neck: Without JVD, thyromegaly, bruit, or adenopathy.  Lungs: Clear to auscultation in all fields.  Heart: Regular rate and rhythm without murmur, rub, gallop, or click.  Abdomen: Soft, nontender, without hepatosplenomegaly or hernia.  Bowel sounds normal.  : Deferred.  Rectal: Deferred.  Extremities: Without clubbing, cyanosis, edema, or pulse deficit.  Neurologic: Intact without focal deficit.  Normal station and gait observed during ingress and egress from the examination room.  Skin: Without significant lesion.  Musculoskeletal: Unremarkable.    Lab/other results:    CBC normal.  CPK normal.  CMP normal.  NMR profile is normal with a total cholesterol of 133, triglyceride 106, LDL particle #677, HDL particle #44.7.  Vitamin D normal at 58.4.  Urinalysis normal.  Thyroid function test " normal.    Assessment/Plan:     Diagnosis Plan   1. Routine physical examination  CK    Comprehensive Metabolic Panel    NMR LipoProfile    Vitamin D,25-Hydroxy    Urinalysis With Microscopic If Indicated (No Culture) - Urine, Clean Catch    TSH    T4, Free    T3, Free      2. Hyperlipidemia  Comprehensive Metabolic Panel    NMR LipoProfile    ezetimibe-simvastatin (VYTORIN) 10-20 MG per tablet      3. Primary hypothyroidism  TSH    T4, Free    T3, Free    levothyroxine (SYNTHROID, LEVOTHROID) 50 MCG tablet      4. Vitamin D deficiency  Vitamin D,25-Hydroxy      5. Multiple environmental allergies        6. Allergic rhinitis        7. Chronic insomnia  zolpidem (AMBIEN) 10 MG tablet      8. Generalized anxiety disorder        9. Generalized osteoarthritis of multiple sites        10. Therapeutic drug monitoring  CK    Urinalysis With Microscopic If Indicated (No Culture) - Urine, Clean Catch      11. Need for influenza vaccination        12. Overweight (BMI 25.0-29.9)        13. Menopausal symptoms  Progesterone (PROMETRIUM) 100 MG capsule    estradiol (VIVELLE-DOT) 0.0375 MG/24HR patch        Patient presents with essentially normal annual physical/preventative visit.  Her medical problems as noted above are stable.    Several preventative health issues discussed including review of vaccinations and recommendations, including dietary issues, exercise and weight loss.  Safe sex practices discussed.  Patient advised to wear seatbelt whenever driving and avoid texting and driving.  Also advised to look both ways before crossing the street.  Colon cancer prevention discussed and is up-to-date with colonoscopy.  Advised to avoid tobacco products and minimize alcohol consumption.    Plan is as follows: No change in current medical regimen.  Patient will follow-up in 1 year for her annual physical or follow-up as needed.  Influenza vaccine recommended.  COVID-19 vaccine patient refuses.      Procedures

## 2022-12-05 ENCOUNTER — APPOINTMENT (OUTPATIENT)
Dept: WOMENS IMAGING | Facility: HOSPITAL | Age: 52
End: 2022-12-05

## 2022-12-05 PROCEDURE — 76642 ULTRASOUND BREAST LIMITED: CPT | Performed by: RADIOLOGY

## 2022-12-05 PROCEDURE — 77061 BREAST TOMOSYNTHESIS UNI: CPT | Performed by: RADIOLOGY

## 2022-12-05 PROCEDURE — 77065 DX MAMMO INCL CAD UNI: CPT | Performed by: RADIOLOGY

## 2022-12-05 PROCEDURE — G0279 TOMOSYNTHESIS, MAMMO: HCPCS | Performed by: RADIOLOGY

## 2023-01-23 DIAGNOSIS — F51.04 CHRONIC INSOMNIA: Chronic | ICD-10-CM

## 2023-01-25 ENCOUNTER — TELEPHONE (OUTPATIENT)
Dept: INTERNAL MEDICINE | Facility: CLINIC | Age: 53
End: 2023-01-25
Payer: COMMERCIAL

## 2023-01-25 RX ORDER — FLUCONAZOLE 200 MG/1
200 TABLET ORAL DAILY
Qty: 5 TABLET | Refills: 2 | Status: SHIPPED | OUTPATIENT
Start: 2023-01-25

## 2023-01-25 RX ORDER — ZOLPIDEM TARTRATE 10 MG/1
TABLET ORAL
Qty: 30 TABLET | Refills: 5 | Status: SHIPPED | OUTPATIENT
Start: 2023-01-25

## 2023-01-25 NOTE — TELEPHONE ENCOUNTER
Caller: Louann Roberts    Relationship: Self    Best call back [phone]  What medication are you requesting: DIFLUCAN    What are your current symptoms: HAS BEEN TAKING ANTIBIOTICS     Have you had these symptoms before:    [x] Yes  [] No    Have you been treated for these symptoms before:   [x] Yes  [] No    If a prescription is needed, what is your preferred pharmacy and phone number: [facility]/pharmacy #[id number] - [place], [place] - [address] - [phone] [facility] [phone]   [phone]     Additional notes: USUALLY GETS ONE SCRIPT TO TAKE BEFORE TAKING ANTIBIOTICS AND ONE TO TAKE AFTER.

## 2023-02-27 ENCOUNTER — TELEPHONE (OUTPATIENT)
Dept: INTERNAL MEDICINE | Facility: CLINIC | Age: 53
End: 2023-02-27
Payer: COMMERCIAL

## 2023-08-24 DIAGNOSIS — F51.04 CHRONIC INSOMNIA: Chronic | ICD-10-CM

## 2023-08-24 RX ORDER — ZOLPIDEM TARTRATE 10 MG/1
TABLET ORAL
Qty: 30 TABLET | Refills: 1 | Status: SHIPPED | OUTPATIENT
Start: 2023-08-24

## 2023-09-01 ENCOUNTER — TELEPHONE (OUTPATIENT)
Dept: INTERNAL MEDICINE | Facility: CLINIC | Age: 53
End: 2023-09-01

## 2023-09-01 DIAGNOSIS — B00.1 FEVER BLISTER: Primary | ICD-10-CM

## 2023-09-01 RX ORDER — VALACYCLOVIR HYDROCHLORIDE 500 MG/1
TABLET, FILM COATED ORAL
Qty: 10 TABLET | Refills: 3 | Status: SHIPPED | OUTPATIENT
Start: 2023-09-01

## 2023-09-01 NOTE — TELEPHONE ENCOUNTER
Caller: Louann Roberts    Relationship: Self    Best call back number: [phone]     What medication are you requesting: SOMETHING FOR FEVER BLISTER     What are your current symptoms: FEVER BLISTER     How long have you been experiencing symptoms: STARTED TUESDAY     Have you had these symptoms before:    [x] Yes  [] No    Have you been treated for these symptoms before:   [x] Yes  [] No    If a prescription is needed, what is your preferred pharmacy and phone number: [facility] [id number] [place] [address] - [phone]  - [phone]      Additional notes:PATIENT CALLING WANTING TO KNOW IF A PRESCRIPTION COULD BE CALLED INTO THE PHARMACY.

## 2023-12-01 DIAGNOSIS — F51.04 CHRONIC INSOMNIA: Chronic | ICD-10-CM

## 2023-12-01 RX ORDER — ZOLPIDEM TARTRATE 10 MG/1
TABLET ORAL
Qty: 30 TABLET | Refills: 5 | Status: SHIPPED | OUTPATIENT
Start: 2023-12-01

## 2023-12-04 ENCOUNTER — HOSPITAL ENCOUNTER (OUTPATIENT)
Dept: GENERAL RADIOLOGY | Facility: HOSPITAL | Age: 53
Discharge: HOME | End: 2023-12-04
Admitting: INTERNAL MEDICINE
Payer: COMMERCIAL

## 2023-12-04 ENCOUNTER — OFFICE VISIT (OUTPATIENT)
Dept: INTERNAL MEDICINE | Facility: CLINIC | Age: 53
End: 2023-12-04
Payer: COMMERCIAL

## 2023-12-04 ENCOUNTER — HOSPITAL ENCOUNTER (OUTPATIENT)
Dept: CT IMAGING | Facility: HOSPITAL | Age: 53
Discharge: HOME | End: 2023-12-04
Payer: COMMERCIAL

## 2023-12-04 VITALS
BODY MASS INDEX: 28.34 KG/M2 | OXYGEN SATURATION: 98 % | WEIGHT: 166 LBS | DIASTOLIC BLOOD PRESSURE: 74 MMHG | TEMPERATURE: 97.2 F | HEART RATE: 73 BPM | HEIGHT: 64 IN | SYSTOLIC BLOOD PRESSURE: 124 MMHG

## 2023-12-04 DIAGNOSIS — M54.50 ACUTE RIGHT-SIDED LOW BACK PAIN WITHOUT SCIATICA: Primary | ICD-10-CM

## 2023-12-04 DIAGNOSIS — R10.31 RIGHT LOWER QUADRANT ABDOMINAL PAIN: ICD-10-CM

## 2023-12-04 PROCEDURE — 0 DIATRIZOATE MEGLUMINE & SODIUM PER 1 ML: Performed by: INTERNAL MEDICINE

## 2023-12-04 PROCEDURE — 99214 OFFICE O/P EST MOD 30 MIN: CPT | Performed by: INTERNAL MEDICINE

## 2023-12-04 PROCEDURE — 72110 X-RAY EXAM L-2 SPINE 4/>VWS: CPT

## 2023-12-04 PROCEDURE — 25510000001 IOPAMIDOL 61 % SOLUTION: Performed by: INTERNAL MEDICINE

## 2023-12-04 PROCEDURE — 74177 CT ABD & PELVIS W/CONTRAST: CPT

## 2023-12-04 RX ORDER — PREDNISONE 10 MG/1
TABLET ORAL
Qty: 46 TABLET | Refills: 0 | Status: SHIPPED | OUTPATIENT
Start: 2023-12-04 | End: 2023-12-07 | Stop reason: SDUPTHER

## 2023-12-04 RX ADMIN — DIATRIZOATE MEGLUMINE AND DIATRIZOATE SODIUM 30 ML: 660; 100 LIQUID ORAL; RECTAL at 17:40

## 2023-12-04 RX ADMIN — IOPAMIDOL 85 ML: 612 INJECTION, SOLUTION INTRAVENOUS at 17:40

## 2023-12-04 NOTE — PROGRESS NOTES
12/04/2023    Patient Information  Louann Roberts                                                                                          [address] [facility] [address]      1970  [unfilled]  There is no work phone number on file.    Chief Complaint:     Complaining of right lower quadrant abdominal pain and right-sided back pain.    History of Present Illness:    Patient reports that about 5 weeks ago she strained her back lifting a heavy object and the pain in her back on the right side got bad enough for her to go to the urgent care on 2 different occasions.  On the second occasion, she was given a Medrol Dosepak which patient reports helped with her back pain but she had also developed pain in the right lower quadrant and this helped ease that somewhat also.  Today her primary concern is that of the right lower quadrant abdominal pain which is described as somewhat of an ache or fullness.  She has had no fever or chills or other systemic signs or symptoms.  She still has her appendix.  No current nausea or vomiting and no change in bowel habits including constipation or diarrhea.  She is describing a little bit of lightheadedness    Review of Systems   Constitutional: Negative.   HENT: Negative.     Eyes: Negative.    Cardiovascular: Negative.    Respiratory: Negative.     Endocrine: Negative.    Hematologic/Lymphatic: Negative.    Skin: Negative.    Musculoskeletal:  Positive for back pain. Negative for falls, muscle cramps, muscle weakness and myalgias.   Gastrointestinal:  Positive for abdominal pain.   Genitourinary:  Positive for flank pain and pelvic pain. Negative for bladder incontinence, dysuria, frequency, genital sores, hematuria, hesitancy, incomplete emptying, menorrhagia, nocturia, non-menstrual bleeding and urgency.   Neurological: Negative.  Negative for numbness and paresthesias.   Psychiatric/Behavioral: Negative.     Allergic/Immunologic: Negative.        Active  Problems:    Patient Active Problem List   Diagnosis    Allergic rhinitis    Hyperlipidemia    Primary hypothyroidism    Multiple environmental allergies    Chronic insomnia    Vitamin D deficiency    Therapeutic drug monitoring    Routine physical examination    Generalized osteoarthritis of multiple sites    Chronic pain of right knee    Patellofemoral arthritis of right knee    Overweight (BMI 25.0-29.9)    Right lower quadrant abdominal pain    Acute right-sided low back pain without sciatica         Past Medical History:   Diagnosis Date    Allergic rhinitis 06/28/2016    Patient has never had allergy testing. Primarily has seasonal allergies.    Chronic insomnia 06/28/2016    Chronic pain of right knee 06/03/2022    Generalized osteoarthritis of multiple sites 11/10/2016    11/10/2016--patient reports she's having some problems with joint pain and stiffness particularly in the morning.  This particularly involves her hands and fingers and does seem to get better through the day.  She also has similar symptoms of her knees.  I have recommended Cosamin ASU.    History of Dysplastic nevus of skin 03/24/2015 03/24/2014--5 mm punch biopsy performed for suspicious pigmented lesion mid, left back. Pathology returned compound melanocytic nevus with mild architectural disorder and mild cytologic atypia of melanocytes. (Dysplastic nevus, mild). Negative margins.    History of menorrhagia 12/12/2016 12/12/2016--Procedure: DILATATION AND CURETTAGE HYSTEROSCOPY NOVASURE ;  Surgeon: Imelda Brito MD;  Location: [facility];  Service:     History of migraine 03/18/2014 03/18/2014--patient reports that her migraine headaches have essentially resolved. She has not had one for several years.    History of routine gynecologic exam yearly    Patient sees a gynecologist.    Hyperlipidemia 02/26/2014 02/26/2014--total cholesterol 206, triglycerides normal at 86, LDL cholesterol 135, HDL cholesterol 54. Recommend  diet and exercise. Reevaluated in a few months with an NMR.    Hypothyroidism 06/15/2015    09/13/2016--patient seen in follow-up and the results of the thyroid ultrasound discussed.  She has had an elevated TSH on several occasions over the past year or more.  She does have some symptoms consisting of fatigue, depression, weight gain, hair loss.  Levothyroxine 50 µg per day initiated.  Patient will follow-up in about 6 weeks with nonfasting lab work to reassess.  08/29/2016--normal thyroid ultrasound.  08/19/2016--routine annual exam.  TSH lightly elevated at 4.26.  Upper limit of normal at 4.20.  Ultrasound of thyroid ordered.  06/15/2015--TSH elevated at 4.51.  Thyroid antibodies were negative.  06/15/2015--patient seen for a routine physical examination and noted to have a mildly elevated TSH of 5.05. Repeat thyroid function tests ordered as well as thyroid antibodies. Patient will follow up on the phone.    Multiple environmental allergies 06/28/2016    Patient has never had allergy testing. Primarily has seasonal allergies.    Patellofemoral arthritis of right knee 06/03/2022    Vitamin D deficiency 08/16/2016         Past Surgical History:   Procedure Laterality Date    BREAST AUGMENTATION Bilateral 1992    D & C HYSTEROSCOPY ENDOMETRIAL ABLATION N/A 12/12/2016 12/12/2016--Procedure: DILATATION AND CURETTAGE HYSTEROSCOPY NOVASURE ;  Surgeon: Imelda Brito MD;  Location: [facility];  Service:     SKIN BIOPSY  03/24/2014 03/24/2014--5 mm punch biopsy performed for suspicious pigmented lesion mid, left back. Pathology returned compound melanocytic nevus with mild architectural disorder and mild cytologic atypia of melanocytes. (Dysplastic nevus, mild). Negative margins.         Allergies   Allergen Reactions    Sulfa Antibiotics Rash           Current Outpatient Medications:     estradiol (VIVELLE-DOT) 0.0375 MG/24HR patch, Apply patch as directed twice weekly, Disp: , Rfl:     ezetimibe-simvastatin  "(VYTORIN) 10-20 MG per tablet, Take 1 p.o. daily for high cholesterol., Disp: 90 tablet, Rfl: 3    levothyroxine (SYNTHROID, LEVOTHROID) 50 MCG tablet, Take 1 p.o. daily for low thyroid, Disp: 90 tablet, Rfl: 3    Progesterone (PROMETRIUM) 100 MG capsule, Take 1 p.o. daily as directed for menopause, Disp: 20 capsule, Rfl:     vitamin D3 125 MCG (5000 UT) capsule capsule, 1 by mouth daily as directed, Disp: 30 capsule, Rfl:     zolpidem (AMBIEN) 10 MG tablet, TAKE 1 TABLET BY MOUTH EVERY DAY AS NEEDED, Disp: 30 tablet, Rfl: 5    predniSONE (DELTASONE) 10 MG tablet, 5 by mouth daily 5 days, then 4 daily 2 days, 3 daily 2 days, 2 daily 2 days, 1 daily 2 days, one half daily 2 days and then discontinue., Disp: 46 tablet, Rfl: 0      Family History   Problem Relation Age of Onset    Hyperlipidemia Mother     Hyperlipidemia Father     Diabetes Paternal Uncle         Type 2    Diabetes Paternal Grandmother         Type 2         Social History     Socioeconomic History    Marital status:     Number of children: 0    Highest education level: Some college, no degree   Tobacco Use    Smoking status: Former     Packs/day: 1.00     Years: 12.00     Additional pack years: 0.00     Total pack years: 12.00     Types: Cigarettes, Electronic Cigarette     Quit date: 07/2013     Years since quitting: 10.4    Smokeless tobacco: Never    Tobacco comments:     used vapor prior to quitting    Vaping Use    Vaping Use: Never used   Substance and Sexual Activity    Alcohol use: Yes     Comment: Occasionally    Drug use: No    Sexual activity: Yes     Partners: Male         Vitals:    12/04/23 1551   BP: 124/74   Pulse: 73   Temp: 97.2 °F (36.2 °C)   SpO2: 98%   Weight: 75.3 kg (166 lb)   Height: 162.6 cm (64.02\")        Body mass index is 28.48 kg/m².      Physical Exam:    General: Alert and oriented x 3.  No acute distress.  Normal affect.  HEENT: Pupils equal, round, reactive to light; extraocular movements intact; sclerae " nonicteric; pharynx, ear canals and TMs normal.  Neck: Without JVD, thyromegaly, bruit, or adenopathy.  Lungs: Clear to auscultation in all fields.  Heart: Regular rate and rhythm without murmur, rub, gallop, or click.  Abdomen: Soft, without hepatosplenomegaly or hernia.  There is some tenderness in the right lower quadrant with slight voluntary guarding.  Bowel sounds normal.  : Deferred.  Rectal: Deferred.  Extremities: Without clubbing, cyanosis, edema, or pulse deficit.  Neurologic: Intact without focal deficit.  Normal station and gait observed during ingress and egress from the examination room.  Skin: Without significant lesion.  Musculoskeletal: Unremarkable.    Lab/other results:      Assessment/Plan:     Diagnosis Plan   1. Acute right-sided low back pain without sciatica  predniSONE (DELTASONE) 10 MG tablet    XR Spine Lumbar Complete 4+VW      2. Right lower quadrant abdominal pain  CT Abdomen Pelvis With Contrast        Patient presents with complaints of right lower quadrant abdominal pain and the exact etiology is somewhat difficult to ascertain at the present time.  She strained her back about 5 weeks ago and although the symptoms are somewhat improving, there is still present and now the primary concern is that of the right lower quadrant abdominal pain.  She still has her appendix.  Diverticulitis would also be in the differential as well as gynecologic pathology.  Of course, this may just be referred pain from her back although I would not expect this to be present after this duration.  She is not having any sciatic symptoms.  Urinary difficulties is also concern such as kidney stones but this seems less likely as well.  The bottom line is as I think she needs scanning of her abdomen to rule out such pathology.    Plan is as follows: Stat CT scan of the abdomen pelvis with contrast.  I will hold off on treating her with medication until we have a better idea of what is going on.  If the abdomen  is benign then a round of prednisone might be in order and I will go ahead and send that into the pharmacy but patient instructed not to start taking it until she hears from me.    Addendum: CT scan of the abdomen and pelvis done with contrast will be performed at 5:30 PM tonight.  I will contact patient if there is any serious pathology.  Patient is aware to hold off on the prednisone until she hears from me.    Procedures

## 2023-12-07 ENCOUNTER — OFFICE VISIT (OUTPATIENT)
Dept: INTERNAL MEDICINE | Facility: CLINIC | Age: 53
End: 2023-12-07
Payer: COMMERCIAL

## 2023-12-07 VITALS
TEMPERATURE: 97.1 F | BODY MASS INDEX: 28.58 KG/M2 | SYSTOLIC BLOOD PRESSURE: 120 MMHG | OXYGEN SATURATION: 98 % | HEIGHT: 64 IN | DIASTOLIC BLOOD PRESSURE: 68 MMHG | HEART RATE: 73 BPM | WEIGHT: 167.4 LBS

## 2023-12-07 DIAGNOSIS — E03.9 PRIMARY HYPOTHYROIDISM: Chronic | ICD-10-CM

## 2023-12-07 DIAGNOSIS — M54.50 ACUTE RIGHT-SIDED LOW BACK PAIN WITHOUT SCIATICA: ICD-10-CM

## 2023-12-07 DIAGNOSIS — R10.31 RIGHT LOWER QUADRANT ABDOMINAL PAIN: ICD-10-CM

## 2023-12-07 DIAGNOSIS — M15.9 GENERALIZED OSTEOARTHRITIS OF MULTIPLE SITES: Chronic | ICD-10-CM

## 2023-12-07 DIAGNOSIS — E55.9 VITAMIN D DEFICIENCY: Chronic | ICD-10-CM

## 2023-12-07 DIAGNOSIS — Z86.16 HISTORY OF 2019 NOVEL CORONAVIRUS DISEASE (COVID-19): ICD-10-CM

## 2023-12-07 DIAGNOSIS — E78.2 MIXED HYPERLIPIDEMIA: Chronic | ICD-10-CM

## 2023-12-07 DIAGNOSIS — F51.04 CHRONIC INSOMNIA: Chronic | ICD-10-CM

## 2023-12-07 DIAGNOSIS — Z91.09 MULTIPLE ENVIRONMENTAL ALLERGIES: Chronic | ICD-10-CM

## 2023-12-07 DIAGNOSIS — J30.1 CHRONIC SEASONAL ALLERGIC RHINITIS DUE TO POLLEN: Chronic | ICD-10-CM

## 2023-12-07 DIAGNOSIS — Z51.81 THERAPEUTIC DRUG MONITORING: ICD-10-CM

## 2023-12-07 DIAGNOSIS — E66.3 OVERWEIGHT (BMI 25.0-29.9): Chronic | ICD-10-CM

## 2023-12-07 DIAGNOSIS — Z00.00 ROUTINE PHYSICAL EXAMINATION: Primary | ICD-10-CM

## 2023-12-07 PROBLEM — M51.36 DEGENERATIVE DISC DISEASE, LUMBAR: Status: ACTIVE | Noted: 2023-12-07

## 2023-12-07 PROBLEM — M51.36 DEGENERATIVE DISC DISEASE, LUMBAR: Chronic | Status: ACTIVE | Noted: 2023-12-07

## 2023-12-07 PROBLEM — M51.369 DEGENERATIVE DISC DISEASE, LUMBAR: Chronic | Status: ACTIVE | Noted: 2023-12-07

## 2023-12-07 PROBLEM — M17.11 PATELLOFEMORAL ARTHRITIS OF RIGHT KNEE: Chronic | Status: RESOLVED | Noted: 2022-06-03 | Resolved: 2023-12-07

## 2023-12-07 PROBLEM — M51.369 DEGENERATIVE DISC DISEASE, LUMBAR: Status: ACTIVE | Noted: 2023-12-07

## 2023-12-07 RX ORDER — PRAVASTATIN SODIUM 20 MG
TABLET ORAL
Qty: 90 TABLET | Refills: 3 | Status: SHIPPED | OUTPATIENT
Start: 2023-12-07

## 2023-12-07 RX ORDER — PREDNISONE 10 MG/1
TABLET ORAL
Qty: 46 TABLET | Refills: 0 | Status: SHIPPED | OUTPATIENT
Start: 2023-12-07

## 2023-12-07 NOTE — PROGRESS NOTES
12/07/2023    Patient Information  Louann Roberts                                                                                          [address] [facility] [address]      1970  [unfilled]  There is no work phone number on file.    Chief Complaint:     Routine annual physical examination/preventative visit.  Follow-up new complaints of right lower quadrant abdominal pain and right-sided lower back pain.    History of Present Illness:    Patient with a history of medical problems as outlined below presents today for her routine annual physical exam.  However, she has had some new complaints that we are in the process of evaluating and will complete that today and this is above and beyond the scope of her routine physical.  See below.    Patient presented December 5, 2023 with the following complaints:    Patient reports that about 5 weeks ago she strained her back lifting a heavy object and the pain in her back on the right side got bad enough for her to go to the urgent care on 2 different occasions.  On the second occasion, she was given a Medrol Dosepak which patient reports helped with her back pain but she had also developed pain in the right lower quadrant and this helped ease that somewhat also.  Today her primary concern is that of the right lower quadrant abdominal pain which is described as somewhat of an ache or fullness.  She has had no fever or chills or other systemic signs or symptoms.  She still has her appendix.  No current nausea or vomiting and no change in bowel habits including constipation or diarrhea.  She is describing a little bit of lightheadedness    I ordered a CT scan of her abdomen pelvis to rule out abdominal and pelvic pathology and also ordered an x-ray of the lumbar spine which we will review today as well.       Review of Systems   Constitutional: Negative.   HENT: Negative.     Eyes: Negative.    Cardiovascular: Negative.    Respiratory: Negative.      Endocrine: Negative.    Hematologic/Lymphatic: Negative.    Skin: Negative.    Musculoskeletal: Negative.  Positive for arthritis and back pain.   Gastrointestinal: Negative.  Positive for abdominal pain.   Genitourinary: Negative.    Neurological: Negative.    Psychiatric/Behavioral: Negative.     Allergic/Immunologic: Negative.        Active Problems:    Patient Active Problem List   Diagnosis    Allergic rhinitis    Hyperlipidemia    Primary hypothyroidism    Multiple environmental allergies    Chronic insomnia    Vitamin D deficiency    Therapeutic drug monitoring    Routine physical examination    Generalized osteoarthritis of multiple sites    Chronic pain of right knee    Overweight (BMI 25.0-29.9)    Right lower quadrant abdominal pain    Acute right-sided low back pain without sciatica    Degenerative disc disease, lumbar    History of 2019 novel coronavirus disease (COVID-19)         Past Medical History:   Diagnosis Date    Allergic rhinitis 06/28/2016    Patient has never had allergy testing. Primarily has seasonal allergies.    Chronic insomnia 06/28/2016    Chronic pain of right knee 06/03/2022    Degenerative disc disease, lumbar 12/07/2023 December 5, 2023--XR SPINE LUMBAR COMPLETE 4+VW-     Clinical: Back pain     FINDINGS: There is facet hypertrophy at L5-S1. Spur formation along the  anterior superior L5 and L4 endplates. No compression deformity,  spondylolysis or spondylolisthesis. Mild disc base narrowing L5-S1,  remaining the spaces maintained.        CONCLUSION: Degenerative change as scribed above.     This report was final    Generalized osteoarthritis of multiple sites 11/10/2016    11/10/2016--patient reports she's having some problems with joint pain and stiffness particularly in the morning.  This particularly involves her hands and fingers and does seem to get better through the day.  She also has similar symptoms of her knees.  I have recommended Cosamin ASU.    History of  Dysplastic nevus of skin 03/24/2015 03/24/2014--5 mm punch biopsy performed for suspicious pigmented lesion mid, left back. Pathology returned compound melanocytic nevus with mild architectural disorder and mild cytologic atypia of melanocytes. (Dysplastic nevus, mild). Negative margins.    History of menorrhagia 12/12/2016 12/12/2016--Procedure: DILATATION AND CURETTAGE HYSTEROSCOPY NOVASURE ;  Surgeon: Imelda Brito MD;  Location: [facility];  Service:     History of migraine 03/18/2014 03/18/2014--patient reports that her migraine headaches have essentially resolved. She has not had one for several years.    History of routine gynecologic exam yearly    Patient sees a gynecologist.    Hyperlipidemia 02/26/2014 02/26/2014--total cholesterol 206, triglycerides normal at 86, LDL cholesterol 135, HDL cholesterol 54. Recommend diet and exercise. Reevaluated in a few months with an NMR.    Hypothyroidism 06/15/2015    09/13/2016--patient seen in follow-up and the results of the thyroid ultrasound discussed.  She has had an elevated TSH on several occasions over the past year or more.  She does have some symptoms consisting of fatigue, depression, weight gain, hair loss.  Levothyroxine 50 µg per day initiated.  Patient will follow-up in about 6 weeks with nonfasting lab work to reassess.  08/29/2016--normal thyroid ultrasound.  08/19/2016--routine annual exam.  TSH lightly elevated at 4.26.  Upper limit of normal at 4.20.  Ultrasound of thyroid ordered.  06/15/2015--TSH elevated at 4.51.  Thyroid antibodies were negative.  06/15/2015--patient seen for a routine physical examination and noted to have a mildly elevated TSH of 5.05. Repeat thyroid function tests ordered as well as thyroid antibodies. Patient will follow up on the phone.    Multiple environmental allergies 06/28/2016    Patient has never had allergy testing. Primarily has seasonal allergies.    Patellofemoral arthritis of right knee  06/03/2022    Vitamin D deficiency 08/16/2016         Past Surgical History:   Procedure Laterality Date    BREAST AUGMENTATION Bilateral 1992    D & C HYSTEROSCOPY ENDOMETRIAL ABLATION N/A 12/12/2016 12/12/2016--Procedure: DILATATION AND CURETTAGE HYSTEROSCOPY NOVASURE ;  Surgeon: Imelda Brito MD;  Location: [facility];  Service:     SKIN BIOPSY  03/24/2014 03/24/2014--5 mm punch biopsy performed for suspicious pigmented lesion mid, left back. Pathology returned compound melanocytic nevus with mild architectural disorder and mild cytologic atypia of melanocytes. (Dysplastic nevus, mild). Negative margins.         Allergies   Allergen Reactions    Sulfa Antibiotics Rash           Current Outpatient Medications:     estradiol (VIVELLE-DOT) 0.0375 MG/24HR patch, Apply patch as directed twice weekly, Disp: , Rfl:     levothyroxine (SYNTHROID, LEVOTHROID) 50 MCG tablet, Take 1 p.o. daily for low thyroid, Disp: 90 tablet, Rfl: 3    predniSONE (DELTASONE) 10 MG tablet, 5 by mouth daily 5 days, then 4 daily 2 days, 3 daily 2 days, 2 daily 2 days, 1 daily 2 days, one half daily 2 days and then discontinue., Disp: 46 tablet, Rfl: 0    Progesterone (PROMETRIUM) 100 MG capsule, Take 1 p.o. daily as directed for menopause, Disp: 20 capsule, Rfl:     vitamin D3 125 MCG (5000 UT) capsule capsule, 1 by mouth daily as directed, Disp: 30 capsule, Rfl:     zolpidem (AMBIEN) 10 MG tablet, TAKE 1 TABLET BY MOUTH EVERY DAY AS NEEDED, Disp: 30 tablet, Rfl: 5    pravastatin (Pravachol) 20 MG tablet, Take 1 p.o. daily for high cholesterol., Disp: 90 tablet, Rfl: 3      Family History   Problem Relation Age of Onset    Hyperlipidemia Mother     Hyperlipidemia Father     Diabetes Paternal Uncle         Type 2    Diabetes Paternal Grandmother         Type 2         Social History     Socioeconomic History    Marital status:     Number of children: 0    Highest education level: Some college, no degree   Tobacco Use     "Smoking status: Former     Packs/day: 1.00     Years: 12.00     Additional pack years: 0.00     Total pack years: 12.00     Types: Cigarettes, Electronic Cigarette     Quit date: 07/2013     Years since quitting: 10.4    Smokeless tobacco: Never    Tobacco comments:     used vapor prior to quitting    Vaping Use    Vaping Use: Never used   Substance and Sexual Activity    Alcohol use: Yes     Comment: Occasionally    Drug use: No    Sexual activity: Yes     Partners: Male         Vitals:    12/07/23 1043   BP: 120/68   Pulse: 73   Temp: 97.1 °F (36.2 °C)   SpO2: 98%   Weight: 75.9 kg (167 lb 6.4 oz)   Height: 162.6 cm (64.02\")        Body mass index is 28.72 kg/m².      Physical Exam:    General: Alert and oriented x 3.  No acute distress.  Mildly overweight.  Normal affect.  HEENT: Pupils equal, round, reactive to light; extraocular movements intact; sclerae nonicteric; pharynx, ear canals and TMs normal.  Neck: Without JVD, thyromegaly, bruit, or adenopathy.  Lungs: Clear to auscultation in all fields.  Heart: Regular rate and rhythm without murmur, rub, gallop, or click.  Abdomen: Soft, nontender, without hepatosplenomegaly or hernia.  Bowel sounds normal.  : Deferred.  Rectal: Deferred.  Extremities: Without clubbing, cyanosis, edema, or pulse deficit.  Neurologic: Intact without focal deficit.  Normal station and gait observed during ingress and egress from the examination room.  Skin: Without significant lesion.  Musculoskeletal: Unremarkable.    Lab/other results:    CT scan of the abdomen and pelvis revealed a 20 mm suspected uterine fibroid and otherwise negative abdominal and pelvis CT.  X-ray of the lumbar spine revealed facet hypertrophy at L5-S1.  Spur formation along the anterior superior L5 and L4 endplates.  No compression deformity, spondylolithiasis or spondylolisthesis.  Mild disc space narrowing at L5-S1.  Remaining spaces are maintained.  Assessment was degenerative changes.  Mild to minimal " degenerative disc disease.    CPK normal.  CMP normal except BUN/creatinine ratio 27, sodium 133.  Total cholesterol is 232, triglycerides 172, LDL particle #1663, HDL particle #37.6.  Vitamin D normal at 54.5.  Urinalysis normal.  Thyroid function test normal.  CBC was not done.    Assessment/Plan:     Diagnosis Plan   1. Routine physical examination  CBC (No Diff)    CBC (No Diff)    CK    Comprehensive Metabolic Panel    NMR LipoProfile    TSH    T4, Free    T3, Free    Urinalysis With Microscopic If Indicated (No Culture) - Urine, Clean Catch    Vitamin D,25-Hydroxy      2. Hyperlipidemia  CK    Comprehensive Metabolic Panel    NMR LipoProfile    CK    Comprehensive Metabolic Panel    NMR LipoProfile    pravastatin (Pravachol) 20 MG tablet      3. Vitamin D deficiency  Vitamin D,25-Hydroxy      4. Overweight (BMI 25.0-29.9)        5. Multiple environmental allergies        6. Allergic rhinitis        7. Chronic insomnia        8. Generalized osteoarthritis of multiple sites        9. Primary hypothyroidism  TSH    T4, Free    T3, Free    TSH    T4, Free    T3, Free      10. Acute right-sided low back pain without sciatica  predniSONE (DELTASONE) 10 MG tablet      11. Right lower quadrant abdominal pain        12. Therapeutic drug monitoring  CBC (No Diff)    CBC (No Diff)    Urinalysis With Microscopic If Indicated (No Culture) - Urine, Clean Catch      13. History of 2019 novel coronavirus disease (COVID-19)  SARS-CoV-2 Antibodies, Nucleocapsid (Natural Immunity)        Patient presents for routine annual physical exam.  She has hyperlipidemia and it appears she is not taking her Vytorin.  Her cholesterol profile was much better a year ago when she was.  Vitamin D is in the normal range which is important given her perimenopausal state.  Patient is overweight and I strongly recommended low carbohydrate diet, exercise, and weight loss.  Her environmental allergies/allergic rhinitis currently stable.  Chronic  insomnia treated with Ambien and I have no evidence she is abusing this medication.  She does have generalized osteoarthritis of multiple sites and has new complaints of acute right-sided back pain that radiated into the abdomen/pelvis.  There is no evidence of intra pelvic or abdominal pathology.  She has minimal degenerative disc disease and arthritis of the lumbar spine.  I think a round of prednisone would be appropriate.  She has hypothyroidism and is therapeutic on the current dose of levothyroxine.    Several preventative health issues discussed including review of vaccinations and recommendations, including dietary issues, exercise and weight loss.  Safe sex practices discussed.  Patient advised to wear seatbelt whenever driving and avoid texting and driving.  Also advised to look both ways before crossing the street.  Colon cancer prevention discussed and is up-to-date with colonoscopy.  Advised to avoid tobacco products and minimize alcohol consumption.    Plan is as follows: Prednisone tapering dose.  No changes in current medical regimen.  Strongly encourage patient to be compliant with Vytorin.  Patient will follow-up in 1 year with lab prior for her annual physical or follow-up as needed.    Addendum: Patient had stopped her Vytorin due to myalgias at my direction.  This resulted in resolution of the myalgias and therefore we need to come up with a different game plan for her cholesterol.  Plan is as follows: We will formally discontinue the ezetimibe/simvastatin and start pravastatin 20 mg/day.  I will have her obtain fasting lab work in about 8 weeks and follow-up to reassess.  If she develops any problems in the meantime then she should contact me.      Procedures         I will SWITCH the dose or number of times a day I take the medications listed below when I get home from the hospital:  None

## 2023-12-20 DIAGNOSIS — E03.9 PRIMARY HYPOTHYROIDISM: Chronic | ICD-10-CM

## 2023-12-20 RX ORDER — LEVOTHYROXINE SODIUM 0.05 MG/1
TABLET ORAL
Qty: 90 TABLET | Refills: 3 | Status: SHIPPED | OUTPATIENT
Start: 2023-12-20

## 2024-02-07 ENCOUNTER — LAB (OUTPATIENT)
Dept: LAB | Facility: HOSPITAL | Age: 54
End: 2024-02-07
Payer: COMMERCIAL

## 2024-02-07 DIAGNOSIS — E03.9 PRIMARY HYPOTHYROIDISM: Chronic | ICD-10-CM

## 2024-02-07 DIAGNOSIS — Z00.00 ROUTINE PHYSICAL EXAMINATION: ICD-10-CM

## 2024-02-07 DIAGNOSIS — Z51.81 THERAPEUTIC DRUG MONITORING: ICD-10-CM

## 2024-02-07 DIAGNOSIS — E78.2 MIXED HYPERLIPIDEMIA: Chronic | ICD-10-CM

## 2024-02-07 DIAGNOSIS — E55.9 VITAMIN D DEFICIENCY: Chronic | ICD-10-CM

## 2024-02-07 LAB
25(OH)D3 SERPL-MCNC: 55.3 NG/ML (ref 30–100)
ALBUMIN SERPL-MCNC: 4.4 G/DL (ref 3.5–5.2)
ALBUMIN/GLOB SERPL: 1.9 G/DL
ALP SERPL-CCNC: 57 U/L (ref 39–117)
ALT SERPL W P-5'-P-CCNC: 12 U/L (ref 1–33)
ANION GAP SERPL CALCULATED.3IONS-SCNC: 11 MMOL/L (ref 5–15)
AST SERPL-CCNC: 14 U/L (ref 1–32)
BILIRUB SERPL-MCNC: 0.5 MG/DL (ref 0–1.2)
BILIRUB UR QL STRIP: NEGATIVE
BUN SERPL-MCNC: 21 MG/DL (ref 6–20)
BUN/CREAT SERPL: 26.6 (ref 7–25)
CALCIUM SPEC-SCNC: 9.4 MG/DL (ref 8.6–10.5)
CHLORIDE SERPL-SCNC: 100 MMOL/L (ref 98–107)
CK SERPL-CCNC: 190 U/L (ref 20–180)
CLARITY UR: ABNORMAL
CO2 SERPL-SCNC: 25 MMOL/L (ref 22–29)
COLOR UR: YELLOW
CREAT SERPL-MCNC: 0.79 MG/DL (ref 0.57–1)
EGFRCR SERPLBLD CKD-EPI 2021: 89.6 ML/MIN/1.73
GLOBULIN UR ELPH-MCNC: 2.3 GM/DL
GLUCOSE SERPL-MCNC: 95 MG/DL (ref 65–99)
GLUCOSE UR STRIP-MCNC: NEGATIVE MG/DL
HGB UR QL STRIP.AUTO: NEGATIVE
KETONES UR QL STRIP: NEGATIVE
LEUKOCYTE ESTERASE UR QL STRIP.AUTO: NEGATIVE
NITRITE UR QL STRIP: NEGATIVE
PH UR STRIP.AUTO: 6 [PH] (ref 5–8)
POTASSIUM SERPL-SCNC: 4.6 MMOL/L (ref 3.5–5.2)
PROT SERPL-MCNC: 6.7 G/DL (ref 6–8.5)
PROT UR QL STRIP: NEGATIVE
SODIUM SERPL-SCNC: 136 MMOL/L (ref 136–145)
SP GR UR STRIP: 1.01 (ref 1–1.03)
T3FREE SERPL-MCNC: 3.34 PG/ML (ref 2–4.4)
T4 FREE SERPL-MCNC: 1.39 NG/DL (ref 0.93–1.7)
TSH SERPL DL<=0.05 MIU/L-ACNC: 2.05 UIU/ML (ref 0.27–4.2)
UROBILINOGEN UR QL STRIP: ABNORMAL

## 2024-02-07 PROCEDURE — 83704 LIPOPROTEIN BLD QUAN PART: CPT | Performed by: INTERNAL MEDICINE

## 2024-02-07 PROCEDURE — 36415 COLL VENOUS BLD VENIPUNCTURE: CPT

## 2024-02-07 PROCEDURE — 80061 LIPID PANEL: CPT | Performed by: INTERNAL MEDICINE

## 2024-02-07 PROCEDURE — 81003 URINALYSIS AUTO W/O SCOPE: CPT | Performed by: INTERNAL MEDICINE

## 2024-02-07 PROCEDURE — 84443 ASSAY THYROID STIM HORMONE: CPT | Performed by: INTERNAL MEDICINE

## 2024-02-07 PROCEDURE — 82306 VITAMIN D 25 HYDROXY: CPT | Performed by: INTERNAL MEDICINE

## 2024-02-07 PROCEDURE — 84481 FREE ASSAY (FT-3): CPT | Performed by: INTERNAL MEDICINE

## 2024-02-07 PROCEDURE — 80053 COMPREHEN METABOLIC PANEL: CPT | Performed by: INTERNAL MEDICINE

## 2024-02-07 PROCEDURE — 82550 ASSAY OF CK (CPK): CPT | Performed by: INTERNAL MEDICINE

## 2024-02-07 PROCEDURE — 84439 ASSAY OF FREE THYROXINE: CPT | Performed by: INTERNAL MEDICINE

## 2024-02-08 LAB
CHOLEST SERPL-MCNC: 182 MG/DL (ref 100–199)
HDL SERPL-SCNC: 40.8 UMOL/L
HDLC SERPL-MCNC: 54 MG/DL
LDL SERPL QN: 20.6 NM
LDL SERPL-SCNC: 1288 NMOL/L
LDL SMALL SERPL-SCNC: 576 NMOL/L
LDLC SERPL CALC-MCNC: 103 MG/DL (ref 0–99)
TRIGL SERPL-MCNC: 145 MG/DL (ref 0–149)

## 2024-02-14 ENCOUNTER — OFFICE VISIT (OUTPATIENT)
Dept: INTERNAL MEDICINE | Facility: CLINIC | Age: 54
End: 2024-02-14
Payer: COMMERCIAL

## 2024-02-14 VITALS
DIASTOLIC BLOOD PRESSURE: 70 MMHG | HEART RATE: 85 BPM | RESPIRATION RATE: 16 BRPM | SYSTOLIC BLOOD PRESSURE: 138 MMHG | HEIGHT: 64 IN | WEIGHT: 166 LBS | OXYGEN SATURATION: 97 % | TEMPERATURE: 96.9 F | BODY MASS INDEX: 28.34 KG/M2

## 2024-02-14 DIAGNOSIS — Z51.81 THERAPEUTIC DRUG MONITORING: ICD-10-CM

## 2024-02-14 DIAGNOSIS — T46.6X5A MYALGIA DUE TO STATIN: ICD-10-CM

## 2024-02-14 DIAGNOSIS — E55.9 VITAMIN D DEFICIENCY: Chronic | ICD-10-CM

## 2024-02-14 DIAGNOSIS — M79.10 MYALGIA DUE TO STATIN: ICD-10-CM

## 2024-02-14 DIAGNOSIS — Z00.00 ROUTINE PHYSICAL EXAMINATION: ICD-10-CM

## 2024-02-14 DIAGNOSIS — E03.9 PRIMARY HYPOTHYROIDISM: Chronic | ICD-10-CM

## 2024-02-14 DIAGNOSIS — E78.2 MIXED HYPERLIPIDEMIA: Primary | Chronic | ICD-10-CM

## 2024-02-14 DIAGNOSIS — Z86.16 HISTORY OF 2019 NOVEL CORONAVIRUS DISEASE (COVID-19): Chronic | ICD-10-CM

## 2024-02-14 PROBLEM — R10.31 RIGHT LOWER QUADRANT ABDOMINAL PAIN: Status: RESOLVED | Noted: 2023-12-04 | Resolved: 2024-02-14

## 2024-02-14 PROBLEM — M54.50 ACUTE RIGHT-SIDED LOW BACK PAIN WITHOUT SCIATICA: Status: RESOLVED | Noted: 2023-12-04 | Resolved: 2024-02-14

## 2024-02-14 RX ORDER — PRAVASTATIN SODIUM 20 MG
TABLET ORAL
Start: 2024-02-14

## 2024-06-25 ENCOUNTER — PATIENT MESSAGE (OUTPATIENT)
Dept: INTERNAL MEDICINE | Facility: CLINIC | Age: 54
End: 2024-06-25
Payer: COMMERCIAL

## 2024-06-26 ENCOUNTER — TELEPHONE (OUTPATIENT)
Dept: INTERNAL MEDICINE | Facility: CLINIC | Age: 54
End: 2024-06-26

## 2024-06-26 NOTE — TELEPHONE ENCOUNTER
Caller: Louann Roberts    Relationship: Self    Best call back number: [phone]     What medication are you requesting: VALTREX    What are your current symptoms: FEVER BLISTERS    If a prescription is needed, what is your preferred pharmacy and phone number: [facility] [id number] [place] [address] - [phone]  - [phone]     Additional notes:

## 2024-06-26 NOTE — TELEPHONE ENCOUNTER
From: Louann Roberts  To: Eulogio Steven  Sent: 6/25/2024 7:22 PM EDT  Subject: Fever Blister    Hello. Would it be possible to get a new prescription for fever blisters? Years ago, Dr. Steven prescribed Valtrex 500 MG.   I really appreciate it.  Thank you,  Louann

## 2024-07-01 RX ORDER — VALACYCLOVIR HYDROCHLORIDE 500 MG/1
500 TABLET, FILM COATED ORAL 2 TIMES DAILY
Qty: 20 TABLET | Refills: 5 | Status: SHIPPED | OUTPATIENT
Start: 2024-07-01 | End: 2024-07-11

## 2024-07-18 DIAGNOSIS — F51.04 CHRONIC INSOMNIA: Chronic | ICD-10-CM

## 2024-07-18 RX ORDER — ZOLPIDEM TARTRATE 10 MG/1
TABLET ORAL
Qty: 30 TABLET | Refills: 0 | Status: SHIPPED | OUTPATIENT
Start: 2024-07-18

## 2024-07-18 NOTE — TELEPHONE ENCOUNTER
Rx Refill Note  Requested Prescriptions     Pending Prescriptions Disp Refills    zolpidem (AMBIEN) 10 MG tablet [Pharmacy Med Name: ZOLPIDEM TARTRATE 10 MG TABLET] 30 tablet 0     Sig: TAKE 1 TABLET BY MOUTH EVERY DAY AS NEEDED      Last office visit with prescribing clinician: 2/14/2024   Last telemedicine visit with prescribing clinician: Visit date not found   Next office visit with prescribing clinician: 12/11/2024                         Would you like a call back once the refill request has been completed: [] Yes [] No    If the office needs to give you a call back, can they leave a voicemail: [] Yes [] No    Zuri Hemphill MA  07/18/24, 15:32 EDT

## 2024-11-08 DIAGNOSIS — E78.2 MIXED HYPERLIPIDEMIA: Chronic | ICD-10-CM

## 2024-11-08 RX ORDER — PRAVASTATIN SODIUM 20 MG
TABLET ORAL
Qty: 90 TABLET | Refills: 3 | Status: SHIPPED | OUTPATIENT
Start: 2024-11-08

## 2024-12-04 ENCOUNTER — TELEPHONE (OUTPATIENT)
Dept: INTERNAL MEDICINE | Facility: CLINIC | Age: 54
End: 2024-12-04

## 2024-12-04 NOTE — TELEPHONE ENCOUNTER
Caller: Louann Roberts    Relationship to patient: Self    Best call back number: [phone]    Chief complaint: CANCEL LABS          If rescheduling, when is the original appointment: 12/4/24     Additional notes:WILL CALL BACK TO RESCHEDULE

## 2024-12-26 ENCOUNTER — APPOINTMENT (OUTPATIENT)
Dept: WOMENS IMAGING | Facility: HOSPITAL | Age: 54
End: 2024-12-26
Payer: COMMERCIAL

## 2024-12-26 PROCEDURE — 77067 SCR MAMMO BI INCL CAD: CPT | Performed by: RADIOLOGY

## 2024-12-26 PROCEDURE — 77063 BREAST TOMOSYNTHESIS BI: CPT | Performed by: RADIOLOGY

## 2025-01-15 ENCOUNTER — TELEPHONE (OUTPATIENT)
Dept: INTERNAL MEDICINE | Facility: CLINIC | Age: 55
End: 2025-01-15

## 2025-01-15 DIAGNOSIS — K11.20 SIALOADENITIS, UNSPECIFIED: Primary | ICD-10-CM

## 2025-01-15 NOTE — TELEPHONE ENCOUNTER
Caller: Louann Roberts    Relationship to patient: Self    Best call back number: [phone]      Patient is needing:     PATIENT CALLED IN STATING WHEN SHE EATS, HER LEFT LOWER JAW SWELLS UP AND GETS VERY HARD.     IT USUALLY GOES AWAY WITHIN AN HOUR.     PLEASE CALL PATIENT TO DISCUSS.

## 2025-01-23 ENCOUNTER — LAB (OUTPATIENT)
Dept: LAB | Facility: HOSPITAL | Age: 55
End: 2025-01-23
Payer: COMMERCIAL

## 2025-01-23 DIAGNOSIS — E78.2 MIXED HYPERLIPIDEMIA: Chronic | ICD-10-CM

## 2025-01-23 DIAGNOSIS — E03.9 PRIMARY HYPOTHYROIDISM: Chronic | ICD-10-CM

## 2025-01-23 DIAGNOSIS — Z51.81 THERAPEUTIC DRUG MONITORING: ICD-10-CM

## 2025-01-23 DIAGNOSIS — Z86.16 HISTORY OF 2019 NOVEL CORONAVIRUS DISEASE (COVID-19): Chronic | ICD-10-CM

## 2025-01-23 DIAGNOSIS — Z00.00 ROUTINE PHYSICAL EXAMINATION: ICD-10-CM

## 2025-01-23 DIAGNOSIS — E55.9 VITAMIN D DEFICIENCY: Chronic | ICD-10-CM

## 2025-01-23 LAB
25(OH)D3 SERPL-MCNC: 44.7 NG/ML (ref 30–100)
ALBUMIN SERPL-MCNC: 4.4 G/DL (ref 3.5–5.2)
ALBUMIN/GLOB SERPL: 1.5 G/DL
ALP SERPL-CCNC: 64 U/L (ref 39–117)
ALT SERPL W P-5'-P-CCNC: 20 U/L (ref 1–33)
ANION GAP SERPL CALCULATED.3IONS-SCNC: 8.4 MMOL/L (ref 5–15)
AST SERPL-CCNC: 21 U/L (ref 1–32)
BILIRUB SERPL-MCNC: 0.6 MG/DL (ref 0–1.2)
BILIRUB UR QL STRIP: NEGATIVE
BUN SERPL-MCNC: 14 MG/DL (ref 6–20)
BUN/CREAT SERPL: 17.1 (ref 7–25)
CALCIUM SPEC-SCNC: 9.4 MG/DL (ref 8.6–10.5)
CHLORIDE SERPL-SCNC: 100 MMOL/L (ref 98–107)
CK SERPL-CCNC: 135 U/L (ref 20–180)
CLARITY UR: CLEAR
CO2 SERPL-SCNC: 27.6 MMOL/L (ref 22–29)
COLOR UR: YELLOW
CREAT SERPL-MCNC: 0.82 MG/DL (ref 0.57–1)
DEPRECATED RDW RBC AUTO: 40.2 FL (ref 37–54)
EGFRCR SERPLBLD CKD-EPI 2021: 85.1 ML/MIN/1.73
ERYTHROCYTE [DISTWIDTH] IN BLOOD BY AUTOMATED COUNT: 12.4 % (ref 12.3–15.4)
GLOBULIN UR ELPH-MCNC: 2.9 GM/DL
GLUCOSE SERPL-MCNC: 101 MG/DL (ref 65–99)
GLUCOSE UR STRIP-MCNC: NEGATIVE MG/DL
HCT VFR BLD AUTO: 40.8 % (ref 34–46.6)
HGB BLD-MCNC: 13.6 G/DL (ref 12–15.9)
HGB UR QL STRIP.AUTO: NEGATIVE
KETONES UR QL STRIP: NEGATIVE
LEUKOCYTE ESTERASE UR QL STRIP.AUTO: NEGATIVE
MCH RBC QN AUTO: 29.4 PG (ref 26.6–33)
MCHC RBC AUTO-ENTMCNC: 33.3 G/DL (ref 31.5–35.7)
MCV RBC AUTO: 88.3 FL (ref 79–97)
NITRITE UR QL STRIP: NEGATIVE
PH UR STRIP.AUTO: 6.5 [PH] (ref 5–8)
PLATELET # BLD AUTO: 260 10*3/MM3 (ref 140–450)
PMV BLD AUTO: 11 FL (ref 6–12)
POTASSIUM SERPL-SCNC: 4.9 MMOL/L (ref 3.5–5.2)
PROT SERPL-MCNC: 7.3 G/DL (ref 6–8.5)
PROT UR QL STRIP: NEGATIVE
RBC # BLD AUTO: 4.62 10*6/MM3 (ref 3.77–5.28)
SODIUM SERPL-SCNC: 136 MMOL/L (ref 136–145)
SP GR UR STRIP: 1.01 (ref 1–1.03)
T3FREE SERPL-MCNC: 3.42 PG/ML (ref 2–4.4)
T4 FREE SERPL-MCNC: 1.37 NG/DL (ref 0.92–1.68)
TSH SERPL DL<=0.05 MIU/L-ACNC: 2.16 UIU/ML (ref 0.27–4.2)
UROBILINOGEN UR QL STRIP: NORMAL
WBC NRBC COR # BLD AUTO: 8.73 10*3/MM3 (ref 3.4–10.8)

## 2025-01-23 PROCEDURE — 85027 COMPLETE CBC AUTOMATED: CPT

## 2025-01-23 PROCEDURE — 84443 ASSAY THYROID STIM HORMONE: CPT

## 2025-01-23 PROCEDURE — 80061 LIPID PANEL: CPT

## 2025-01-23 PROCEDURE — 81003 URINALYSIS AUTO W/O SCOPE: CPT

## 2025-01-23 PROCEDURE — 84439 ASSAY OF FREE THYROXINE: CPT

## 2025-01-23 PROCEDURE — 80053 COMPREHEN METABOLIC PANEL: CPT

## 2025-01-23 PROCEDURE — 82550 ASSAY OF CK (CPK): CPT

## 2025-01-23 PROCEDURE — 86769 SARS-COV-2 COVID-19 ANTIBODY: CPT

## 2025-01-23 PROCEDURE — 84481 FREE ASSAY (FT-3): CPT

## 2025-01-23 PROCEDURE — 82306 VITAMIN D 25 HYDROXY: CPT

## 2025-01-23 PROCEDURE — 36415 COLL VENOUS BLD VENIPUNCTURE: CPT

## 2025-01-23 PROCEDURE — 83704 LIPOPROTEIN BLD QUAN PART: CPT

## 2025-01-24 LAB
CHOLEST SERPL-MCNC: 191 MG/DL (ref 100–199)
HDL SERPL-SCNC: 40.1 UMOL/L
HDLC SERPL-MCNC: 63 MG/DL
LDL SERPL QN: 21.1 NM
LDL SERPL-SCNC: 1292 NMOL/L
LDL SMALL SERPL-SCNC: 382 NMOL/L
LDLC SERPL CALC-MCNC: 112 MG/DL (ref 0–99)
TRIGL SERPL-MCNC: 88 MG/DL (ref 0–149)

## 2025-01-27 ENCOUNTER — OFFICE VISIT (OUTPATIENT)
Dept: INTERNAL MEDICINE | Facility: CLINIC | Age: 55
End: 2025-01-27
Payer: COMMERCIAL

## 2025-01-27 VITALS
OXYGEN SATURATION: 97 % | DIASTOLIC BLOOD PRESSURE: 72 MMHG | HEART RATE: 67 BPM | RESPIRATION RATE: 16 BRPM | BODY MASS INDEX: 28.85 KG/M2 | HEIGHT: 64 IN | SYSTOLIC BLOOD PRESSURE: 120 MMHG | TEMPERATURE: 98.9 F | WEIGHT: 169 LBS

## 2025-01-27 DIAGNOSIS — M51.360 DEGENERATION OF INTERVERTEBRAL DISC OF LUMBAR REGION WITH DISCOGENIC BACK PAIN: Chronic | ICD-10-CM

## 2025-01-27 DIAGNOSIS — J30.1 CHRONIC SEASONAL ALLERGIC RHINITIS DUE TO POLLEN: Chronic | ICD-10-CM

## 2025-01-27 DIAGNOSIS — K11.20 SIALOADENITIS, UNSPECIFIED: ICD-10-CM

## 2025-01-27 DIAGNOSIS — E78.2 MIXED HYPERLIPIDEMIA: Chronic | ICD-10-CM

## 2025-01-27 DIAGNOSIS — E03.9 PRIMARY HYPOTHYROIDISM: Chronic | ICD-10-CM

## 2025-01-27 DIAGNOSIS — M15.9 GENERALIZED OSTEOARTHRITIS OF MULTIPLE SITES: Chronic | ICD-10-CM

## 2025-01-27 DIAGNOSIS — Z86.16 HISTORY OF 2019 NOVEL CORONAVIRUS DISEASE (COVID-19): Chronic | ICD-10-CM

## 2025-01-27 DIAGNOSIS — M79.10 MYALGIA DUE TO STATIN: ICD-10-CM

## 2025-01-27 DIAGNOSIS — Z51.81 THERAPEUTIC DRUG MONITORING: ICD-10-CM

## 2025-01-27 DIAGNOSIS — Z91.09 MULTIPLE ENVIRONMENTAL ALLERGIES: Chronic | ICD-10-CM

## 2025-01-27 DIAGNOSIS — T46.6X5A MYALGIA DUE TO STATIN: ICD-10-CM

## 2025-01-27 DIAGNOSIS — E55.9 VITAMIN D DEFICIENCY: Chronic | ICD-10-CM

## 2025-01-27 DIAGNOSIS — E66.3 OVERWEIGHT (BMI 25.0-29.9): Chronic | ICD-10-CM

## 2025-01-27 DIAGNOSIS — Z00.00 ROUTINE PHYSICAL EXAMINATION: Primary | ICD-10-CM

## 2025-01-27 DIAGNOSIS — F51.04 CHRONIC INSOMNIA: Chronic | ICD-10-CM

## 2025-01-27 LAB — SARS-COV-2 AB SERPL QL IA: POSITIVE

## 2025-01-27 PROCEDURE — 99396 PREV VISIT EST AGE 40-64: CPT | Performed by: INTERNAL MEDICINE

## 2025-01-27 RX ORDER — ZOLPIDEM TARTRATE 10 MG/1
TABLET ORAL
Qty: 30 TABLET | Refills: 1 | Status: SHIPPED | OUTPATIENT
Start: 2025-01-27

## 2025-01-27 RX ORDER — FLUCONAZOLE 200 MG/1
TABLET ORAL
Qty: 5 TABLET | Refills: 1 | Status: SHIPPED | OUTPATIENT
Start: 2025-01-27

## 2025-01-27 NOTE — PROGRESS NOTES
01/27/2025    Patient Information  Louann Roberts                                                                                          [address] [facility] [address]      1970  [unfilled]  There is no work phone number on file.    Chief Complaint:     Routine annual physical exam/preventative visit.    History of Present Illness:    Patient with chronic medical problems as noted below in assessment plan presents today for routine annual physical exam/preventative visit.  Currently has no new acute complaints.  Her past medical history reviewed and updated were necessary including health maintenance parameters.  This reveals she will be up-to-date or else accounted for after today's visit.    Review of Systems   Constitutional: Negative.   HENT: Negative.     Eyes: Negative.    Cardiovascular: Negative.    Respiratory: Negative.     Endocrine: Negative.    Hematologic/Lymphatic: Negative.    Skin: Negative.    Musculoskeletal:  Positive for back pain.   Gastrointestinal: Negative.    Genitourinary: Negative.    Neurological: Negative.    Psychiatric/Behavioral: Negative.     Allergic/Immunologic: Negative.        Active Problems:    Patient Active Problem List   Diagnosis    Allergic rhinitis    Hyperlipidemia    Primary hypothyroidism    Multiple environmental allergies    Chronic insomnia    Vitamin D deficiency    Therapeutic drug monitoring    Routine physical examination    Generalized osteoarthritis of multiple sites    Chronic pain of right knee    Overweight (BMI 25.0-29.9)    Degenerative disc disease, lumbar    History of 2019 novel coronavirus disease (COVID-19)    Myalgia due to statin    Sialoadenitis, unspecified         Past Medical History:   Diagnosis Date    Allergic rhinitis 06/28/2016    Patient has never had allergy testing. Primarily has seasonal allergies.    Chronic insomnia 06/28/2016    Chronic pain of right knee 06/03/2022    Degenerative disc disease, lumbar  12/07/2023 December 5, 2023--XR SPINE LUMBAR COMPLETE 4+VW-     Clinical: Back pain     FINDINGS: There is facet hypertrophy at L5-S1. Spur formation along the  anterior superior L5 and L4 endplates. No compression deformity,  spondylolysis or spondylolisthesis. Mild disc base narrowing L5-S1,  remaining the spaces maintained.        CONCLUSION: Degenerative change as scribed above.     This report was final    Generalized osteoarthritis of multiple sites 11/10/2016    11/10/2016--patient reports she's having some problems with joint pain and stiffness particularly in the morning.  This particularly involves her hands and fingers and does seem to get better through the day.  She also has similar symptoms of her knees.  I have recommended Cosamin ASU.    History of 2019 novel coronavirus disease (COVID-19) 12/07/2023    History of Dysplastic nevus of skin 03/24/2015 03/24/2014--5 mm punch biopsy performed for suspicious pigmented lesion mid, left back. Pathology returned compound melanocytic nevus with mild architectural disorder and mild cytologic atypia of melanocytes. (Dysplastic nevus, mild). Negative margins.    History of menorrhagia 12/12/2016 12/12/2016--Procedure: DILATATION AND CURETTAGE HYSTEROSCOPY NOVASURE ;  Surgeon: Imelda Brito MD;  Location: [facility];  Service:     History of migraine 03/18/2014 03/18/2014--patient reports that her migraine headaches have essentially resolved. She has not had one for several years.    History of routine gynecologic exam yearly    Patient sees a gynecologist.    Hyperlipidemia 02/26/2014 02/26/2014--total cholesterol 206, triglycerides normal at 86, LDL cholesterol 135, HDL cholesterol 54. Recommend diet and exercise. Reevaluated in a few months with an NMR.    Hypothyroidism 06/15/2015    09/13/2016--patient seen in follow-up and the results of the thyroid ultrasound discussed.  She has had an elevated TSH on several occasions over the past  year or more.  She does have some symptoms consisting of fatigue, depression, weight gain, hair loss.  Levothyroxine 50 µg per day initiated.  Patient will follow-up in about 6 weeks with nonfasting lab work to reassess.  08/29/2016--normal thyroid ultrasound.  08/19/2016--routine annual exam.  TSH lightly elevated at 4.26.  Upper limit of normal at 4.20.  Ultrasound of thyroid ordered.  06/15/2015--TSH elevated at 4.51.  Thyroid antibodies were negative.  06/15/2015--patient seen for a routine physical examination and noted to have a mildly elevated TSH of 5.05. Repeat thyroid function tests ordered as well as thyroid antibodies. Patient will follow up on the phone.    Multiple environmental allergies 06/28/2016    Patient has never had allergy testing. Primarily has seasonal allergies.    Patellofemoral arthritis of right knee 06/03/2022    Vitamin D deficiency 08/16/2016         Past Surgical History:   Procedure Laterality Date    BREAST AUGMENTATION Bilateral 1992    D & C HYSTEROSCOPY ENDOMETRIAL ABLATION N/A 12/12/2016 12/12/2016--Procedure: DILATATION AND CURETTAGE HYSTEROSCOPY NOVASURE ;  Surgeon: Imelda Brito MD;  Location: [facility];  Service:     SKIN BIOPSY  03/24/2014 03/24/2014--5 mm punch biopsy performed for suspicious pigmented lesion mid, left back. Pathology returned compound melanocytic nevus with mild architectural disorder and mild cytologic atypia of melanocytes. (Dysplastic nevus, mild). Negative margins.         Allergies   Allergen Reactions    Sulfa Antibiotics Rash           Current Outpatient Medications:     amoxicillin-clavulanate (AUGMENTIN) 875-125 MG per tablet, Take 1 tablet by mouth Every 12 (Twelve) Hours., Disp: , Rfl:     estradiol (VIVELLE-DOT) 0.0375 MG/24HR patch, Apply patch as directed twice weekly, Disp: , Rfl:     levothyroxine (SYNTHROID, LEVOTHROID) 50 MCG tablet, TAKE 1 BY MOUTH DAILY FOR LOW THYROID, Disp: 90 tablet, Rfl: 3    pravastatin (PRAVACHOL) 20  "MG tablet, TAKE 1 TABLET BY MOUTH DAILY FOR HIGH CHOLESTEROL., Disp: 90 tablet, Rfl: 3    Progesterone (PROMETRIUM) 100 MG capsule, Take 1 p.o. daily as directed for menopause, Disp: 20 capsule, Rfl:     vitamin D3 125 MCG (5000 UT) capsule capsule, 1 by mouth daily as directed, Disp: 30 capsule, Rfl:     zolpidem (AMBIEN) 10 MG tablet, TAKE 1 TABLET BY MOUTH EVERY DAY AS NEEDED, Disp: 30 tablet, Rfl: 1    fluconazole (DIFLUCAN) 200 MG tablet, Take 1 tablet p.o. daily until gone, Disp: 5 tablet, Rfl: 1      Family History   Problem Relation Age of Onset    Hyperlipidemia Mother     Hyperlipidemia Father     Diabetes Paternal Uncle         Type 2    Diabetes Paternal Grandmother         Type 2         Social History     Socioeconomic History    Marital status:     Number of children: 0    Highest education level: Some college, no degree   Tobacco Use    Smoking status: Former     Current packs/day: 0.00     Average packs/day: 1 pack/day for 12.0 years (12.0 ttl pk-yrs)     Types: Cigarettes, Electronic Cigarette     Start date: 2001     Quit date: 2013     Years since quittin.5    Smokeless tobacco: Never    Tobacco comments:     used vapor prior to quitting    Vaping Use    Vaping status: Never Used   Substance and Sexual Activity    Alcohol use: Yes     Comment: Occasionally    Drug use: No    Sexual activity: Yes     Partners: Male         Vitals:    25 1305   BP: 120/72   Pulse: 67   Resp: 16   Temp: 98.9 °F (37.2 °C)   TempSrc: Oral   SpO2: 97%   Weight: 76.7 kg (169 lb)   Height: 162.6 cm (64.02\")        Body mass index is 28.99 kg/m².      Physical Exam:    General: Alert and oriented x 3.  No acute distress.  Normal affect.  Mildly overweight.  HEENT: Pupils equal, round, reactive to light; extraocular movements intact; sclerae nonicteric; pharynx, ear canals and TMs normal.  Neck: Without JVD, thyromegaly, bruit, or adenopathy.  Lungs: Clear to auscultation in all fields.  Heart: " Regular rate and rhythm without murmur, rub, gallop, or click.  Abdomen: Soft, nontender, without hepatosplenomegaly or hernia.  Bowel sounds normal.  : Deferred.  Rectal: Deferred.  Extremities: Without clubbing, cyanosis, edema, or pulse deficit.  Neurologic: Intact without focal deficit.  Normal station and gait observed during ingress and egress from the examination room.  Skin: Without significant lesion.  Musculoskeletal: Unremarkable.    Lab/other results:    CBC normal.  CK normal at 135.  CMP normal except glucose 101.  Total cholesterol 191, triglycerides 88, LDL particle #1292, HDL particle #40.1.  Thyroid function tests are normal.  Vitamin D normal at 44.7.  SARS antibodies pending.  Urinalysis normal.    Assessment/Plan:     Diagnosis Plan   1. Routine physical examination        2. Hyperlipidemia        3. Primary hypothyroidism        4. Vitamin D deficiency        5. Generalized osteoarthritis of multiple sites        6. Overweight (BMI 25.0-29.9)        7. History of 2019 novel coronavirus disease (COVID-19)        8. Myalgia due to statin        9. Degeneration of intervertebral disc of lumbar region with discogenic back pain        10. Chronic insomnia  zolpidem (AMBIEN) 10 MG tablet      11. Multiple environmental allergies        12. Allergic rhinitis        13. Sialoadenitis, unspecified        14. Therapeutic drug monitoring          Patient presents for her routine annual physical exam/preventative visit and seems to be doing well.  Her lab work looks good and her medical problems seem to be stable.  She had a recent episode of what sounds like sialoadenitis and she was referred to ENT and placed on Augmentin.    Several preventative health issues discussed including review of vaccinations and recommendations, including dietary issues, exercise and weight loss.  Safe sex practices discussed.  Patient advised to wear seatbelt whenever driving and avoid texting and driving.  Also advised to  look both ways before crossing the street.  Colon cancer prevention discussed and is up-to-date with colonoscopy.  Advised to avoid tobacco products and minimize alcohol consumption.    Plan is as follows: Patient will work on low-carb diet and attainment of ideal body weight.  No changes in current medical regimen.  Patient will follow-up in 1 year for annual physical or follow-up as needed.    Procedures

## 2025-02-19 ENCOUNTER — TELEPHONE (OUTPATIENT)
Dept: URGENT CARE | Facility: CLINIC | Age: 55
End: 2025-02-19
Payer: COMMERCIAL

## 2025-02-19 DIAGNOSIS — E78.2 MIXED HYPERLIPIDEMIA: Primary | Chronic | ICD-10-CM

## 2025-02-19 DIAGNOSIS — N39.0 UTI (URINARY TRACT INFECTION), UNCOMPLICATED: Primary | ICD-10-CM

## 2025-02-19 RX ORDER — EZETIMIBE 10 MG/1
TABLET ORAL
Qty: 90 TABLET | Refills: 3 | Status: SHIPPED | OUTPATIENT
Start: 2025-02-19

## 2025-02-19 RX ORDER — FLUCONAZOLE 200 MG/1
TABLET ORAL
Qty: 2 TABLET | Refills: 0 | Status: SHIPPED | OUTPATIENT
Start: 2025-02-19

## 2025-03-16 DIAGNOSIS — E03.9 PRIMARY HYPOTHYROIDISM: Chronic | ICD-10-CM

## 2025-03-17 RX ORDER — LEVOTHYROXINE SODIUM 50 UG/1
TABLET ORAL
Qty: 90 TABLET | Refills: 4 | Status: SHIPPED | OUTPATIENT
Start: 2025-03-17

## 2025-04-21 ENCOUNTER — LAB (OUTPATIENT)
Dept: LAB | Facility: HOSPITAL | Age: 55
End: 2025-04-21
Payer: COMMERCIAL

## 2025-04-21 ENCOUNTER — HOSPITAL ENCOUNTER (OUTPATIENT)
Dept: CT IMAGING | Facility: HOSPITAL | Age: 55
Discharge: HOME | End: 2025-04-21
Payer: COMMERCIAL

## 2025-04-21 ENCOUNTER — OFFICE VISIT (OUTPATIENT)
Dept: INTERNAL MEDICINE | Facility: CLINIC | Age: 55
End: 2025-04-21
Payer: COMMERCIAL

## 2025-04-21 VITALS
RESPIRATION RATE: 16 BRPM | BODY MASS INDEX: 29.37 KG/M2 | WEIGHT: 172 LBS | HEIGHT: 64 IN | TEMPERATURE: 98 F | DIASTOLIC BLOOD PRESSURE: 80 MMHG | SYSTOLIC BLOOD PRESSURE: 116 MMHG | HEART RATE: 64 BPM | OXYGEN SATURATION: 97 %

## 2025-04-21 DIAGNOSIS — R11.0 NAUSEA: ICD-10-CM

## 2025-04-21 DIAGNOSIS — R10.31 RIGHT LOWER QUADRANT ABDOMINAL PAIN: ICD-10-CM

## 2025-04-21 DIAGNOSIS — R10.31 RIGHT LOWER QUADRANT ABDOMINAL PAIN: Primary | ICD-10-CM

## 2025-04-21 LAB
BASOPHILS # BLD AUTO: 0.03 10*3/MM3 (ref 0–0.2)
BASOPHILS NFR BLD AUTO: 0.4 % (ref 0–1.5)
DEPRECATED RDW RBC AUTO: 39.6 FL (ref 37–54)
EOSINOPHIL # BLD AUTO: 0.1 10*3/MM3 (ref 0–0.4)
EOSINOPHIL NFR BLD AUTO: 1.4 % (ref 0.3–6.2)
ERYTHROCYTE [DISTWIDTH] IN BLOOD BY AUTOMATED COUNT: 12.3 % (ref 12.3–15.4)
HCT VFR BLD AUTO: 38.7 % (ref 34–46.6)
HGB BLD-MCNC: 12.8 G/DL (ref 12–15.9)
IMM GRANULOCYTES # BLD AUTO: 0.02 10*3/MM3 (ref 0–0.05)
IMM GRANULOCYTES NFR BLD AUTO: 0.3 % (ref 0–0.5)
LYMPHOCYTES # BLD AUTO: 2.42 10*3/MM3 (ref 0.7–3.1)
LYMPHOCYTES NFR BLD AUTO: 34 % (ref 19.6–45.3)
MCH RBC QN AUTO: 29.4 PG (ref 26.6–33)
MCHC RBC AUTO-ENTMCNC: 33.1 G/DL (ref 31.5–35.7)
MCV RBC AUTO: 88.8 FL (ref 79–97)
MONOCYTES # BLD AUTO: 0.66 10*3/MM3 (ref 0.1–0.9)
MONOCYTES NFR BLD AUTO: 9.3 % (ref 5–12)
NEUTROPHILS NFR BLD AUTO: 3.88 10*3/MM3 (ref 1.7–7)
NEUTROPHILS NFR BLD AUTO: 54.6 % (ref 42.7–76)
NRBC BLD AUTO-RTO: 0 /100 WBC (ref 0–0.2)
PLATELET # BLD AUTO: 251 10*3/MM3 (ref 140–450)
PMV BLD AUTO: 11.1 FL (ref 6–12)
PROCALCITONIN SERPL-MCNC: 0.04 NG/ML (ref 0–0.25)
RBC # BLD AUTO: 4.36 10*6/MM3 (ref 3.77–5.28)
WBC NRBC COR # BLD AUTO: 7.11 10*3/MM3 (ref 3.4–10.8)

## 2025-04-21 PROCEDURE — 74177 CT ABD & PELVIS W/CONTRAST: CPT

## 2025-04-21 PROCEDURE — 84145 PROCALCITONIN (PCT): CPT

## 2025-04-21 PROCEDURE — 25510000002 DIATRIZOATE MEGLUMINE & SODIUM PER 1 ML: Performed by: INTERNAL MEDICINE

## 2025-04-21 PROCEDURE — 85025 COMPLETE CBC W/AUTO DIFF WBC: CPT

## 2025-04-21 PROCEDURE — 99214 OFFICE O/P EST MOD 30 MIN: CPT | Performed by: INTERNAL MEDICINE

## 2025-04-21 PROCEDURE — 36415 COLL VENOUS BLD VENIPUNCTURE: CPT

## 2025-04-21 PROCEDURE — 25510000001 IOPAMIDOL 61 % SOLUTION: Performed by: INTERNAL MEDICINE

## 2025-04-21 RX ORDER — ONDANSETRON 8 MG/1
8 TABLET, ORALLY DISINTEGRATING ORAL EVERY 8 HOURS PRN
Qty: 24 TABLET | Refills: 0 | Status: SHIPPED | OUTPATIENT
Start: 2025-04-21

## 2025-04-21 RX ORDER — VALACYCLOVIR HYDROCHLORIDE 500 MG/1
1 TABLET, FILM COATED ORAL DAILY
COMMUNITY
Start: 2025-02-18

## 2025-04-21 RX ORDER — IOPAMIDOL 612 MG/ML
100 INJECTION, SOLUTION INTRAVASCULAR
Status: COMPLETED | OUTPATIENT
Start: 2025-04-21 | End: 2025-04-21

## 2025-04-21 RX ORDER — DIATRIZOATE MEGLUMINE AND DIATRIZOATE SODIUM 660; 100 MG/ML; MG/ML
30 SOLUTION ORAL; RECTAL
Status: COMPLETED | OUTPATIENT
Start: 2025-04-21 | End: 2025-04-21

## 2025-04-21 RX ADMIN — IOPAMIDOL 85 ML: 612 INJECTION, SOLUTION INTRAVENOUS at 14:31

## 2025-04-21 RX ADMIN — DIATRIZOATE MEGLUMINE AND DIATRIZOATE SODIUM 30 ML: 660; 100 LIQUID ORAL; RECTAL at 13:30

## 2025-04-21 NOTE — PROGRESS NOTES
Chief Complaint  Abdominal Pain (right)    Subjective        Louann Roberts presents to [facility]  Abdominal Pain         History of Present Illness  The patient is a 54-year-old female who is a patient of Dr. Ignacio Steven, presenting for a same-day appointment due to right abdominal pain.    She experienced an acute, sharp, knife-like pain in her umbilical region on Saturday, which has been persistent since then. This pain is not a new occurrence, as similar episodes have occurred sporadically over the years. The current episode is unusual in its duration, with the soreness persisting for two days, whereas previous episodes typically resolved within the same day. The pain is described as sharp and stabbing, initially localized to the umbilical area before radiating to the lower right quadrant. It is exacerbated by movement and transitions from a sharp sensation to a general soreness. No specific triggers for the pain have been identified.     No history of abdominal surgery is reported, and she retains both her gallbladder and appendix. No palpable masses or fevers are noted. Bowel movements have been more frequent and softer than usual, a change that has been present for some time but was more pronounced yesterday. No food has been consumed today, only coffee, and mild nausea without vomiting is reported. She is not diabetic and does not use a monitor. Estrogen patches are currently in use. She is not on any antibiotics. Current medications include estrogen and progesterone therapy, with no recent changes in the regimen.     She underwent an ablation procedure several years ago and continues to experience hot flashes. Approximately 4 to 5 months ago, a urinary tract infection (UTI) was initially undetected at an immediate care facility but later confirmed by laboratory analysis. Persistent symptoms led to further evaluation by her gynecologist, where another urine sample initially  "appeared normal but later tested positive for a UTI. A comprehensive hormone panel was also conducted at that time. A history of uterine fibroids is noted, but no other significant medical history is reported.    PAST SURGICAL HISTORY:  - Ablation procedure  - Uterine fibroid removal      Objective   Vital Signs:  /80   Pulse 64   Temp 98 °F (36.7 °C) (Oral)   Resp 16   Ht 162.6 cm (64.02\")   Wt 78 kg (172 lb)   SpO2 97%   BMI 29.51 kg/m²   Estimated body mass index is 29.51 kg/m² as calculated from the following:    Height as of this encounter: 162.6 cm (64.02\").    Weight as of this encounter: 78 kg (172 lb).            Past Medical History:   Diagnosis Date    Allergic rhinitis 06/28/2016    Patient has never had allergy testing. Primarily has seasonal allergies.    Chronic insomnia 06/28/2016    Chronic pain of right knee 06/03/2022    Degenerative disc disease, lumbar 12/07/2023 December 5, 2023--XR SPINE LUMBAR COMPLETE 4+VW-     Clinical: Back pain     FINDINGS: There is facet hypertrophy at L5-S1. Spur formation along the  anterior superior L5 and L4 endplates. No compression deformity,  spondylolysis or spondylolisthesis. Mild disc base narrowing L5-S1,  remaining the spaces maintained.        CONCLUSION: Degenerative change as scribed above.     This report was final    Generalized osteoarthritis of multiple sites 11/10/2016    11/10/2016--patient reports she's having some problems with joint pain and stiffness particularly in the morning.  This particularly involves her hands and fingers and does seem to get better through the day.  She also has similar symptoms of her knees.  I have recommended Cosamin ASU.    History of 2019 novel coronavirus disease (COVID-19) 12/07/2023    History of Dysplastic nevus of skin 03/24/2015 03/24/2014--5 mm punch biopsy performed for suspicious pigmented lesion mid, left back. Pathology returned compound melanocytic nevus with mild architectural disorder " and mild cytologic atypia of melanocytes. (Dysplastic nevus, mild). Negative margins.    History of menorrhagia 12/12/2016 12/12/2016--Procedure: DILATATION AND CURETTAGE HYSTEROSCOPY NOVGIN ;  Surgeon: Imelda Brito MD;  Location: [facility];  Service:     History of migraine 03/18/2014 03/18/2014--patient reports that her migraine headaches have essentially resolved. She has not had one for several years.    History of routine gynecologic exam yearly    Patient sees a gynecologist.    Hyperlipidemia 02/26/2014 02/26/2014--total cholesterol 206, triglycerides normal at 86, LDL cholesterol 135, HDL cholesterol 54. Recommend diet and exercise. Reevaluated in a few months with an NMR.    Hypothyroidism 06/15/2015    09/13/2016--patient seen in follow-up and the results of the thyroid ultrasound discussed.  She has had an elevated TSH on several occasions over the past year or more.  She does have some symptoms consisting of fatigue, depression, weight gain, hair loss.  Levothyroxine 50 µg per day initiated.  Patient will follow-up in about 6 weeks with nonfasting lab work to reassess.  08/29/2016--normal thyroid ultrasound.  08/19/2016--routine annual exam.  TSH lightly elevated at 4.26.  Upper limit of normal at 4.20.  Ultrasound of thyroid ordered.  06/15/2015--TSH elevated at 4.51.  Thyroid antibodies were negative.  06/15/2015--patient seen for a routine physical examination and noted to have a mildly elevated TSH of 5.05. Repeat thyroid function tests ordered as well as thyroid antibodies. Patient will follow up on the phone.    Multiple environmental allergies 06/28/2016    Patient has never had allergy testing. Primarily has seasonal allergies.    Patellofemoral arthritis of right knee 06/03/2022    Vitamin D deficiency 08/16/2016        Family History   Problem Relation Age of Onset    Hyperlipidemia Mother     Hyperlipidemia Father     Diabetes Paternal Uncle         Type 2    Diabetes  Paternal Grandmother         Type 2        Social History     Socioeconomic History    Marital status:     Number of children: 0    Highest education level: Some college, no degree   Tobacco Use    Smoking status: Former     Current packs/day: 0.00     Average packs/day: 1 pack/day for 12.0 years (12.0 ttl pk-yrs)     Types: Cigarettes, Electronic Cigarette     Start date: 2001     Quit date: 2013     Years since quittin.8    Smokeless tobacco: Never    Tobacco comments:     used vapor prior to quitting    Vaping Use    Vaping status: Never Used   Substance and Sexual Activity    Alcohol use: Yes     Comment: Occasionally    Drug use: No    Sexual activity: Yes     Partners: Male        Review of Systems   Gastrointestinal:  Positive for abdominal pain.        Physical Exam  Constitutional:       General: She is not in acute distress.     Appearance: Normal appearance. She is not ill-appearing.   Pulmonary:      Effort: Pulmonary effort is normal.   Abdominal:      General: Abdomen is flat. Bowel sounds are normal. There is no distension.      Palpations: Abdomen is soft. There is no mass.      Tenderness: There is abdominal tenderness. There is no right CVA tenderness, left CVA tenderness, guarding or rebound.      Hernia: No hernia is present.      Comments: Tenderness from umbilical area to right lower quadrant.  Abdomen is soft no guarding no rebound.   Neurological:      Mental Status: She is alert.          Result Review :                Assessment and Plan   Diagnoses and all orders for this visit:    1. Right lower quadrant abdominal pain (Primary)  -     CBC w AUTO Differential; Future  -     Procalcitonin; Future  -     CT Abdomen Pelvis With & Without Contrast    2. Nausea  -     ondansetron ODT (ZOFRAN-ODT) 8 MG disintegrating tablet; Place 1 tablet on the tongue Every 8 (Eight) Hours As Needed for Nausea or Vomiting.  Dispense: 24 tablet; Refill: 0        Assessment & Plan  1. Right  lower quadrant abdominal pain.  - Pain described as sharp and stabbing, starting at the belly button and moving to the lower right quadrant, with soreness persisting for two days.  - Physical examination reveals tenderness in the right lower quadrant without visual lumps or bumps; no fever reported.  - A complete blood count (CBC) and procalcitonin test are ordered to check for infection; a CT scan of the abdomen and pelvis is scheduled for today.  - If the CT scan results are normal, the condition will be monitored.    2. Nausea.  - Patient reports mild nausea without vomiting.  - No recent changes in estrogen and progesterone therapy; patient is not currently taking antibiotics.  - Prescription for Zofran provided to manage nausea; patient instructed to take it as needed.  - Pharmacy: [facility].            Follow Up   No follow-ups on file.  Patient was given instructions and counseling regarding her condition or for health maintenance advice. Please see specific information pulled into the AVS if appropriate.           Patient or patient representative verbalized consent for the use of Ambient Listening during the visit with  Travis Marin MD for chart documentation. 4/21/2025  11:52 EDT

## 2025-06-23 DIAGNOSIS — F51.04 CHRONIC INSOMNIA: Chronic | ICD-10-CM

## 2025-06-23 RX ORDER — ZOLPIDEM TARTRATE 10 MG/1
TABLET ORAL
Qty: 30 TABLET | Refills: 3 | Status: SHIPPED | OUTPATIENT
Start: 2025-06-23